# Patient Record
Sex: FEMALE | Race: WHITE | Employment: FULL TIME | URBAN - METROPOLITAN AREA
[De-identification: names, ages, dates, MRNs, and addresses within clinical notes are randomized per-mention and may not be internally consistent; named-entity substitution may affect disease eponyms.]

---

## 2021-07-30 ENCOUNTER — HOSPITAL ENCOUNTER (EMERGENCY)
Age: 41
Discharge: HOME | End: 2021-07-30
Attending: EMERGENCY MEDICINE
Payer: COMMERCIAL

## 2021-07-30 ENCOUNTER — APPOINTMENT (OUTPATIENT)
Dept: GENERAL RADIOLOGY | Age: 41
End: 2021-07-30
Payer: COMMERCIAL

## 2021-07-30 ENCOUNTER — APPOINTMENT (OUTPATIENT)
Dept: CT IMAGING | Age: 41
End: 2021-07-30
Payer: COMMERCIAL

## 2021-07-30 VITALS
OXYGEN SATURATION: 95 % | SYSTOLIC BLOOD PRESSURE: 139 MMHG | RESPIRATION RATE: 18 BRPM | TEMPERATURE: 99.3 F | WEIGHT: 160 LBS | BODY MASS INDEX: 25.11 KG/M2 | HEART RATE: 127 BPM | HEIGHT: 67 IN | DIASTOLIC BLOOD PRESSURE: 71 MMHG

## 2021-07-30 DIAGNOSIS — M79.10 MYALGIA: ICD-10-CM

## 2021-07-30 DIAGNOSIS — U07.1 COVID-19: Primary | ICD-10-CM

## 2021-07-30 LAB
ALBUMIN SERPL-MCNC: 3.8 G/DL (ref 3.5–5.2)
ALP BLD-CCNC: 85 U/L (ref 35–104)
ALT SERPL-CCNC: 37 U/L (ref 0–32)
ANION GAP SERPL CALCULATED.3IONS-SCNC: 16 MMOL/L (ref 7–16)
APTT: 27.4 SEC (ref 24.5–35.1)
AST SERPL-CCNC: 31 U/L (ref 0–31)
BACTERIA: ABNORMAL /HPF
BASOPHILS ABSOLUTE: 0.01 E9/L (ref 0–0.2)
BASOPHILS RELATIVE PERCENT: 0.2 % (ref 0–2)
BILIRUB SERPL-MCNC: 1.1 MG/DL (ref 0–1.2)
BILIRUBIN URINE: ABNORMAL
BLOOD, URINE: NEGATIVE
BUN BLDV-MCNC: 11 MG/DL (ref 6–20)
CALCIUM SERPL-MCNC: 8.8 MG/DL (ref 8.6–10.2)
CHLORIDE BLD-SCNC: 98 MMOL/L (ref 98–107)
CLARITY: CLEAR
CO2: 23 MMOL/L (ref 22–29)
COLOR: YELLOW
CREAT SERPL-MCNC: 1 MG/DL (ref 0.5–1)
D DIMER: 261 NG/ML DDU
EOSINOPHILS ABSOLUTE: 0 E9/L (ref 0.05–0.5)
EOSINOPHILS RELATIVE PERCENT: 0 % (ref 0–6)
EPITHELIAL CELLS, UA: ABNORMAL /HPF
FIBRINOGEN: >700 MG/DL (ref 225–540)
GFR AFRICAN AMERICAN: >60
GFR NON-AFRICAN AMERICAN: >60 ML/MIN/1.73
GLUCOSE BLD-MCNC: 103 MG/DL (ref 74–99)
GLUCOSE URINE: NEGATIVE MG/DL
HCG(URINE) PREGNANCY TEST: NEGATIVE
HCT VFR BLD CALC: 44.1 % (ref 34–48)
HEMOGLOBIN: 15.3 G/DL (ref 11.5–15.5)
IMMATURE GRANULOCYTES #: 0.04 E9/L
IMMATURE GRANULOCYTES %: 0.7 % (ref 0–5)
INR BLD: 1.3
KETONES, URINE: 15 MG/DL
LACTATE DEHYDROGENASE: 390 U/L (ref 135–214)
LACTIC ACID: 1.6 MMOL/L (ref 0.5–2.2)
LEUKOCYTE ESTERASE, URINE: NEGATIVE
LYMPHOCYTES ABSOLUTE: 1.04 E9/L (ref 1.5–4)
LYMPHOCYTES RELATIVE PERCENT: 18.9 % (ref 20–42)
MAGNESIUM: 2.1 MG/DL (ref 1.6–2.6)
MCH RBC QN AUTO: 30.2 PG (ref 26–35)
MCHC RBC AUTO-ENTMCNC: 34.7 % (ref 32–34.5)
MCV RBC AUTO: 87.2 FL (ref 80–99.9)
MONOCYTES ABSOLUTE: 0.52 E9/L (ref 0.1–0.95)
MONOCYTES RELATIVE PERCENT: 9.5 % (ref 2–12)
MUCUS: PRESENT /LPF
NEUTROPHILS ABSOLUTE: 3.88 E9/L (ref 1.8–7.3)
NEUTROPHILS RELATIVE PERCENT: 70.7 % (ref 43–80)
NITRITE, URINE: NEGATIVE
PDW BLD-RTO: 13 FL (ref 11.5–15)
PH UA: 6 (ref 5–9)
PLATELET # BLD: 243 E9/L (ref 130–450)
PMV BLD AUTO: 10.6 FL (ref 7–12)
POTASSIUM REFLEX MAGNESIUM: 3.4 MMOL/L (ref 3.5–5)
PROTEIN UA: 100 MG/DL
PROTHROMBIN TIME: 14.1 SEC (ref 9.3–12.4)
RBC # BLD: 5.06 E12/L (ref 3.5–5.5)
RBC UA: ABNORMAL /HPF (ref 0–2)
SODIUM BLD-SCNC: 137 MMOL/L (ref 132–146)
SPECIFIC GRAVITY UA: 1.01 (ref 1–1.03)
TOTAL PROTEIN: 7.6 G/DL (ref 6.4–8.3)
TROPONIN, HIGH SENSITIVITY: 6 NG/L (ref 0–9)
UROBILINOGEN, URINE: 0.2 E.U./DL
WBC # BLD: 5.5 E9/L (ref 4.5–11.5)
WBC UA: ABNORMAL /HPF (ref 0–5)

## 2021-07-30 PROCEDURE — 6360000004 HC RX CONTRAST MEDICATION: Performed by: RADIOLOGY

## 2021-07-30 PROCEDURE — 6360000002 HC RX W HCPCS: Performed by: PHYSICIAN ASSISTANT

## 2021-07-30 PROCEDURE — 83735 ASSAY OF MAGNESIUM: CPT

## 2021-07-30 PROCEDURE — 85730 THROMBOPLASTIN TIME PARTIAL: CPT

## 2021-07-30 PROCEDURE — 85610 PROTHROMBIN TIME: CPT

## 2021-07-30 PROCEDURE — 85384 FIBRINOGEN ACTIVITY: CPT

## 2021-07-30 PROCEDURE — 6370000000 HC RX 637 (ALT 250 FOR IP): Performed by: STUDENT IN AN ORGANIZED HEALTH CARE EDUCATION/TRAINING PROGRAM

## 2021-07-30 PROCEDURE — 6360000002 HC RX W HCPCS: Performed by: STUDENT IN AN ORGANIZED HEALTH CARE EDUCATION/TRAINING PROGRAM

## 2021-07-30 PROCEDURE — 71045 X-RAY EXAM CHEST 1 VIEW: CPT

## 2021-07-30 PROCEDURE — 71275 CT ANGIOGRAPHY CHEST: CPT

## 2021-07-30 PROCEDURE — 83605 ASSAY OF LACTIC ACID: CPT

## 2021-07-30 PROCEDURE — 99284 EMERGENCY DEPT VISIT MOD MDM: CPT

## 2021-07-30 PROCEDURE — 85378 FIBRIN DEGRADE SEMIQUANT: CPT

## 2021-07-30 PROCEDURE — 83615 LACTATE (LD) (LDH) ENZYME: CPT

## 2021-07-30 PROCEDURE — 80053 COMPREHEN METABOLIC PANEL: CPT

## 2021-07-30 PROCEDURE — 85025 COMPLETE CBC W/AUTO DIFF WBC: CPT

## 2021-07-30 PROCEDURE — 96375 TX/PRO/DX INJ NEW DRUG ADDON: CPT

## 2021-07-30 PROCEDURE — 87088 URINE BACTERIA CULTURE: CPT

## 2021-07-30 PROCEDURE — 81025 URINE PREGNANCY TEST: CPT

## 2021-07-30 PROCEDURE — 96374 THER/PROPH/DIAG INJ IV PUSH: CPT

## 2021-07-30 PROCEDURE — 84484 ASSAY OF TROPONIN QUANT: CPT

## 2021-07-30 PROCEDURE — 81001 URINALYSIS AUTO W/SCOPE: CPT

## 2021-07-30 PROCEDURE — 2580000003 HC RX 258: Performed by: PHYSICIAN ASSISTANT

## 2021-07-30 RX ORDER — POTASSIUM CHLORIDE 1.5 G/1.77G
40 POWDER, FOR SOLUTION ORAL ONCE
Status: DISCONTINUED | OUTPATIENT
Start: 2021-07-30 | End: 2021-07-30 | Stop reason: CLARIF

## 2021-07-30 RX ORDER — ONDANSETRON 2 MG/ML
4 INJECTION INTRAMUSCULAR; INTRAVENOUS ONCE
Status: COMPLETED | OUTPATIENT
Start: 2021-07-30 | End: 2021-07-30

## 2021-07-30 RX ORDER — KETOROLAC TROMETHAMINE 30 MG/ML
15 INJECTION, SOLUTION INTRAMUSCULAR; INTRAVENOUS ONCE
Status: COMPLETED | OUTPATIENT
Start: 2021-07-30 | End: 2021-07-30

## 2021-07-30 RX ORDER — ACETAMINOPHEN 650 MG/1
650 SUPPOSITORY RECTAL EVERY 6 HOURS PRN
Status: DISCONTINUED | OUTPATIENT
Start: 2021-07-30 | End: 2021-07-30 | Stop reason: HOSPADM

## 2021-07-30 RX ORDER — ACETAMINOPHEN 325 MG/1
650 TABLET ORAL EVERY 6 HOURS PRN
Status: DISCONTINUED | OUTPATIENT
Start: 2021-07-30 | End: 2021-07-30 | Stop reason: HOSPADM

## 2021-07-30 RX ORDER — DEXAMETHASONE SODIUM PHOSPHATE 4 MG/ML
6 INJECTION, SOLUTION INTRA-ARTICULAR; INTRALESIONAL; INTRAMUSCULAR; INTRAVENOUS; SOFT TISSUE ONCE
Status: COMPLETED | OUTPATIENT
Start: 2021-07-30 | End: 2021-07-30

## 2021-07-30 RX ORDER — 0.9 % SODIUM CHLORIDE 0.9 %
1000 INTRAVENOUS SOLUTION INTRAVENOUS ONCE
Status: COMPLETED | OUTPATIENT
Start: 2021-07-30 | End: 2021-07-30

## 2021-07-30 RX ADMIN — DEXAMETHASONE SODIUM PHOSPHATE 6 MG: 4 INJECTION, SOLUTION INTRAMUSCULAR; INTRAVENOUS at 19:16

## 2021-07-30 RX ADMIN — SODIUM CHLORIDE 1000 ML: 9 INJECTION, SOLUTION INTRAVENOUS at 19:15

## 2021-07-30 RX ADMIN — ONDANSETRON 4 MG: 2 INJECTION INTRAMUSCULAR; INTRAVENOUS at 19:16

## 2021-07-30 RX ADMIN — POTASSIUM BICARBONATE 40 MEQ: 782 TABLET, EFFERVESCENT ORAL at 21:40

## 2021-07-30 RX ADMIN — KETOROLAC TROMETHAMINE 15 MG: 30 INJECTION, SOLUTION INTRAMUSCULAR; INTRAVENOUS at 19:16

## 2021-07-30 RX ADMIN — IOPAMIDOL 75 ML: 755 INJECTION, SOLUTION INTRAVENOUS at 20:38

## 2021-07-30 ASSESSMENT — ENCOUNTER SYMPTOMS
SHORTNESS OF BREATH: 1
EYE DISCHARGE: 0
ABDOMINAL DISTENTION: 0
SINUS PRESSURE: 0
VOMITING: 1
COUGH: 1
DIARRHEA: 1
SORE THROAT: 0
NAUSEA: 1
EYE REDNESS: 0
ABDOMINAL PAIN: 0
EYE PAIN: 0
WHEEZING: 0
BACK PAIN: 0

## 2021-07-30 ASSESSMENT — PAIN SCALES - GENERAL: PAINLEVEL_OUTOF10: 7

## 2021-08-01 LAB — URINE CULTURE, ROUTINE: NORMAL

## 2022-05-09 ENCOUNTER — HOSPITAL ENCOUNTER (OUTPATIENT)
Age: 42
Discharge: HOME | End: 2022-05-11

## 2022-05-09 LAB
HCT VFR BLD CALC: 41.9 % (ref 34–48)
HEMOGLOBIN: 14.7 G/DL (ref 11.5–15.5)
MCH RBC QN AUTO: 31.2 PG (ref 26–35)
MCHC RBC AUTO-ENTMCNC: 35.1 % (ref 32–34.5)
MCV RBC AUTO: 89 FL (ref 80–99.9)
PDW BLD-RTO: 12.2 FL (ref 11.5–15)
PLATELET # BLD: 297 E9/L (ref 130–450)
PMV BLD AUTO: 10.7 FL (ref 7–12)
RBC # BLD: 4.71 E12/L (ref 3.5–5.5)
WBC # BLD: 6.6 E9/L (ref 4.5–11.5)

## 2022-05-09 PROCEDURE — 88304 TISSUE EXAM BY PATHOLOGIST: CPT

## 2022-05-09 PROCEDURE — 85027 COMPLETE CBC AUTOMATED: CPT

## 2022-06-24 ENCOUNTER — APPOINTMENT (OUTPATIENT)
Dept: GENERAL RADIOLOGY | Age: 42
DRG: 690 | End: 2022-06-24
Payer: COMMERCIAL

## 2022-06-24 ENCOUNTER — HOSPITAL ENCOUNTER (INPATIENT)
Age: 42
LOS: 3 days | Discharge: HOME | DRG: 690 | End: 2022-06-27
Attending: EMERGENCY MEDICINE | Admitting: FAMILY MEDICINE
Payer: COMMERCIAL

## 2022-06-24 ENCOUNTER — APPOINTMENT (OUTPATIENT)
Dept: CT IMAGING | Age: 42
DRG: 690 | End: 2022-06-24
Payer: COMMERCIAL

## 2022-06-24 DIAGNOSIS — R10.84 GENERALIZED ABDOMINAL PAIN: ICD-10-CM

## 2022-06-24 DIAGNOSIS — D72.829 LEUKOCYTOSIS, UNSPECIFIED TYPE: ICD-10-CM

## 2022-06-24 DIAGNOSIS — E87.6 HYPOKALEMIA: ICD-10-CM

## 2022-06-24 DIAGNOSIS — K59.00 CONSTIPATION, UNSPECIFIED CONSTIPATION TYPE: ICD-10-CM

## 2022-06-24 DIAGNOSIS — R11.0 NAUSEA: ICD-10-CM

## 2022-06-24 DIAGNOSIS — K52.9 ENTEROCOLITIS: Primary | ICD-10-CM

## 2022-06-24 PROBLEM — R10.9 ABDOMINAL PAIN: Status: ACTIVE | Noted: 2022-06-24

## 2022-06-24 PROBLEM — E80.6 HYPERBILIRUBINEMIA: Status: ACTIVE | Noted: 2022-06-24

## 2022-06-24 PROBLEM — Z90.49 HISTORY OF LAPAROSCOPIC CHOLECYSTECTOMY: Status: ACTIVE | Noted: 2022-06-24

## 2022-06-24 PROBLEM — N39.0 UTI (URINARY TRACT INFECTION): Status: ACTIVE | Noted: 2022-06-24

## 2022-06-24 LAB
ALBUMIN SERPL-MCNC: 4.1 G/DL (ref 3.5–5.2)
ALP BLD-CCNC: 107 U/L (ref 35–104)
ALT SERPL-CCNC: 17 U/L (ref 0–32)
AMORPHOUS: ABNORMAL
ANION GAP SERPL CALCULATED.3IONS-SCNC: 18 MMOL/L (ref 7–16)
AST SERPL-CCNC: 23 U/L (ref 0–31)
BACTERIA: ABNORMAL /HPF
BASOPHILS ABSOLUTE: 0.05 E9/L (ref 0–0.2)
BASOPHILS RELATIVE PERCENT: 0.2 % (ref 0–2)
BILIRUB SERPL-MCNC: 2.6 MG/DL (ref 0–1.2)
BILIRUBIN DIRECT: 0.3 MG/DL (ref 0–0.3)
BILIRUBIN DIRECT: 0.3 MG/DL (ref 0–0.3)
BILIRUBIN URINE: NEGATIVE
BILIRUBIN, INDIRECT: 2.3 MG/DL (ref 0–1)
BLOOD, URINE: ABNORMAL
BUN BLDV-MCNC: 15 MG/DL (ref 6–20)
CALCIUM SERPL-MCNC: 9 MG/DL (ref 8.6–10.2)
CHLORIDE BLD-SCNC: 94 MMOL/L (ref 98–107)
CLARITY: ABNORMAL
CO2: 21 MMOL/L (ref 22–29)
COLOR: ABNORMAL
CREAT SERPL-MCNC: 1.2 MG/DL (ref 0.5–1)
EOSINOPHILS ABSOLUTE: 0.11 E9/L (ref 0.05–0.5)
EOSINOPHILS RELATIVE PERCENT: 0.5 % (ref 0–6)
GFR AFRICAN AMERICAN: 60
GFR NON-AFRICAN AMERICAN: 49 ML/MIN/1.73
GLUCOSE BLD-MCNC: 136 MG/DL (ref 74–99)
GLUCOSE URINE: NEGATIVE MG/DL
HCG, URINE, POC: NEGATIVE
HCT VFR BLD CALC: 40.3 % (ref 34–48)
HEMOGLOBIN: 14 G/DL (ref 11.5–15.5)
HYALINE CASTS: ABNORMAL /LPF (ref 0–2)
IMMATURE GRANULOCYTES #: 0.19 E9/L
IMMATURE GRANULOCYTES %: 0.9 % (ref 0–5)
KETONES, URINE: NEGATIVE MG/DL
LACTIC ACID: 2.4 MMOL/L (ref 0.5–2.2)
LEUKOCYTE ESTERASE, URINE: ABNORMAL
LIPASE: 19 U/L (ref 13–60)
LYMPHOCYTES ABSOLUTE: 1.18 E9/L (ref 1.5–4)
LYMPHOCYTES RELATIVE PERCENT: 5.6 % (ref 20–42)
Lab: NORMAL
MAGNESIUM: 1.9 MG/DL (ref 1.6–2.6)
MCH RBC QN AUTO: 30.9 PG (ref 26–35)
MCHC RBC AUTO-ENTMCNC: 34.7 % (ref 32–34.5)
MCV RBC AUTO: 89 FL (ref 80–99.9)
MONOCYTES ABSOLUTE: 0.92 E9/L (ref 0.1–0.95)
MONOCYTES RELATIVE PERCENT: 4.4 % (ref 2–12)
MUCUS: PRESENT /LPF
NEGATIVE QC PASS/FAIL: NORMAL
NEUTROPHILS ABSOLUTE: 18.56 E9/L (ref 1.8–7.3)
NEUTROPHILS RELATIVE PERCENT: 88.4 % (ref 43–80)
NITRITE, URINE: NEGATIVE
PDW BLD-RTO: 13.1 FL (ref 11.5–15)
PH UA: 6 (ref 5–9)
PLATELET # BLD: 238 E9/L (ref 130–450)
PMV BLD AUTO: 10.8 FL (ref 7–12)
POSITIVE QC PASS/FAIL: NORMAL
POTASSIUM REFLEX MAGNESIUM: 3.1 MMOL/L (ref 3.5–5)
PROTEIN UA: NEGATIVE MG/DL
RBC # BLD: 4.53 E12/L (ref 3.5–5.5)
RBC UA: ABNORMAL /HPF (ref 0–2)
SODIUM BLD-SCNC: 133 MMOL/L (ref 132–146)
SPECIFIC GRAVITY UA: 1.02 (ref 1–1.03)
TOTAL PROTEIN: 7.2 G/DL (ref 6.4–8.3)
TSH SERPL DL<=0.05 MIU/L-ACNC: 5.4 UIU/ML (ref 0.27–4.2)
UROBILINOGEN, URINE: 0.2 E.U./DL
WBC # BLD: 21 E9/L (ref 4.5–11.5)
WBC UA: ABNORMAL /HPF (ref 0–5)

## 2022-06-24 PROCEDURE — 36415 COLL VENOUS BLD VENIPUNCTURE: CPT

## 2022-06-24 PROCEDURE — 2580000003 HC RX 258: Performed by: EMERGENCY MEDICINE

## 2022-06-24 PROCEDURE — 2500000003 HC RX 250 WO HCPCS: Performed by: STUDENT IN AN ORGANIZED HEALTH CARE EDUCATION/TRAINING PROGRAM

## 2022-06-24 PROCEDURE — 82248 BILIRUBIN DIRECT: CPT

## 2022-06-24 PROCEDURE — 96375 TX/PRO/DX INJ NEW DRUG ADDON: CPT

## 2022-06-24 PROCEDURE — 83735 ASSAY OF MAGNESIUM: CPT

## 2022-06-24 PROCEDURE — 2580000003 HC RX 258: Performed by: FAMILY MEDICINE

## 2022-06-24 PROCEDURE — 74177 CT ABD & PELVIS W/CONTRAST: CPT

## 2022-06-24 PROCEDURE — 83690 ASSAY OF LIPASE: CPT

## 2022-06-24 PROCEDURE — 81001 URINALYSIS AUTO W/SCOPE: CPT

## 2022-06-24 PROCEDURE — 6360000002 HC RX W HCPCS: Performed by: STUDENT IN AN ORGANIZED HEALTH CARE EDUCATION/TRAINING PROGRAM

## 2022-06-24 PROCEDURE — 83605 ASSAY OF LACTIC ACID: CPT

## 2022-06-24 PROCEDURE — 71045 X-RAY EXAM CHEST 1 VIEW: CPT

## 2022-06-24 PROCEDURE — 87040 BLOOD CULTURE FOR BACTERIA: CPT

## 2022-06-24 PROCEDURE — 2580000003 HC RX 258: Performed by: STUDENT IN AN ORGANIZED HEALTH CARE EDUCATION/TRAINING PROGRAM

## 2022-06-24 PROCEDURE — 96361 HYDRATE IV INFUSION ADD-ON: CPT

## 2022-06-24 PROCEDURE — 85025 COMPLETE CBC W/AUTO DIFF WBC: CPT

## 2022-06-24 PROCEDURE — 2500000003 HC RX 250 WO HCPCS: Performed by: FAMILY MEDICINE

## 2022-06-24 PROCEDURE — 96366 THER/PROPH/DIAG IV INF ADDON: CPT

## 2022-06-24 PROCEDURE — 99285 EMERGENCY DEPT VISIT HI MDM: CPT

## 2022-06-24 PROCEDURE — 84443 ASSAY THYROID STIM HORMONE: CPT

## 2022-06-24 PROCEDURE — 96365 THER/PROPH/DIAG IV INF INIT: CPT

## 2022-06-24 PROCEDURE — 6360000002 HC RX W HCPCS: Performed by: FAMILY MEDICINE

## 2022-06-24 PROCEDURE — 6360000004 HC RX CONTRAST MEDICATION: Performed by: RADIOLOGY

## 2022-06-24 PROCEDURE — 2060000000 HC ICU INTERMEDIATE R&B

## 2022-06-24 PROCEDURE — 80053 COMPREHEN METABOLIC PANEL: CPT

## 2022-06-24 PROCEDURE — 6370000000 HC RX 637 (ALT 250 FOR IP): Performed by: STUDENT IN AN ORGANIZED HEALTH CARE EDUCATION/TRAINING PROGRAM

## 2022-06-24 RX ORDER — SODIUM CHLORIDE 0.9 % (FLUSH) 0.9 %
5-40 SYRINGE (ML) INJECTION PRN
Status: DISCONTINUED | OUTPATIENT
Start: 2022-06-24 | End: 2022-06-27 | Stop reason: HOSPADM

## 2022-06-24 RX ORDER — POTASSIUM CHLORIDE 20 MEQ/1
40 TABLET, EXTENDED RELEASE ORAL ONCE
Status: DISCONTINUED | OUTPATIENT
Start: 2022-06-24 | End: 2022-06-24

## 2022-06-24 RX ORDER — ACETAMINOPHEN 650 MG/1
650 SUPPOSITORY RECTAL EVERY 6 HOURS PRN
Status: DISCONTINUED | OUTPATIENT
Start: 2022-06-24 | End: 2022-06-27 | Stop reason: HOSPADM

## 2022-06-24 RX ORDER — ONDANSETRON 2 MG/ML
4 INJECTION INTRAMUSCULAR; INTRAVENOUS EVERY 6 HOURS PRN
Status: DISCONTINUED | OUTPATIENT
Start: 2022-06-24 | End: 2022-06-27 | Stop reason: HOSPADM

## 2022-06-24 RX ORDER — SODIUM CHLORIDE 0.9 % (FLUSH) 0.9 %
5-40 SYRINGE (ML) INJECTION EVERY 12 HOURS SCHEDULED
Status: DISCONTINUED | OUTPATIENT
Start: 2022-06-24 | End: 2022-06-27 | Stop reason: HOSPADM

## 2022-06-24 RX ORDER — METRONIDAZOLE 500 MG/100ML
500 INJECTION, SOLUTION INTRAVENOUS ONCE
Status: COMPLETED | OUTPATIENT
Start: 2022-06-24 | End: 2022-06-24

## 2022-06-24 RX ORDER — ACETAMINOPHEN 325 MG/1
650 TABLET ORAL EVERY 6 HOURS PRN
Status: DISCONTINUED | OUTPATIENT
Start: 2022-06-24 | End: 2022-06-27 | Stop reason: ALTCHOICE

## 2022-06-24 RX ORDER — MORPHINE SULFATE 2 MG/ML
2 INJECTION, SOLUTION INTRAMUSCULAR; INTRAVENOUS EVERY 4 HOURS PRN
Status: DISCONTINUED | OUTPATIENT
Start: 2022-06-24 | End: 2022-06-27

## 2022-06-24 RX ORDER — MULTIVITAMIN/IRON/FOLIC ACID 18MG-0.4MG
1 TABLET ORAL EVERY MORNING
COMMUNITY

## 2022-06-24 RX ORDER — ONDANSETRON 2 MG/ML
4 INJECTION INTRAMUSCULAR; INTRAVENOUS EVERY 6 HOURS PRN
Status: DISCONTINUED | OUTPATIENT
Start: 2022-06-24 | End: 2022-06-27 | Stop reason: SDUPTHER

## 2022-06-24 RX ORDER — SODIUM CHLORIDE 9 MG/ML
INJECTION, SOLUTION INTRAVENOUS CONTINUOUS
Status: DISCONTINUED | OUTPATIENT
Start: 2022-06-24 | End: 2022-06-25

## 2022-06-24 RX ORDER — POTASSIUM CHLORIDE 7.45 MG/ML
10 INJECTION INTRAVENOUS ONCE
Status: COMPLETED | OUTPATIENT
Start: 2022-06-24 | End: 2022-06-24

## 2022-06-24 RX ORDER — LEVOTHYROXINE SODIUM 0.05 MG/1
50 TABLET ORAL EVERY MORNING
COMMUNITY

## 2022-06-24 RX ORDER — 0.9 % SODIUM CHLORIDE 0.9 %
1000 INTRAVENOUS SOLUTION INTRAVENOUS ONCE
Status: COMPLETED | OUTPATIENT
Start: 2022-06-24 | End: 2022-06-24

## 2022-06-24 RX ORDER — KETOROLAC TROMETHAMINE 30 MG/ML
15 INJECTION, SOLUTION INTRAMUSCULAR; INTRAVENOUS ONCE
Status: COMPLETED | OUTPATIENT
Start: 2022-06-24 | End: 2022-06-24

## 2022-06-24 RX ORDER — SODIUM CHLORIDE 9 MG/ML
INJECTION, SOLUTION INTRAVENOUS PRN
Status: DISCONTINUED | OUTPATIENT
Start: 2022-06-24 | End: 2022-06-27 | Stop reason: HOSPADM

## 2022-06-24 RX ORDER — ONDANSETRON 4 MG/1
4 TABLET, ORALLY DISINTEGRATING ORAL EVERY 8 HOURS PRN
Status: DISCONTINUED | OUTPATIENT
Start: 2022-06-24 | End: 2022-06-27 | Stop reason: HOSPADM

## 2022-06-24 RX ORDER — SODIUM CHLORIDE 9 MG/ML
500 INJECTION, SOLUTION INTRAVENOUS CONTINUOUS
Status: DISCONTINUED | OUTPATIENT
Start: 2022-06-24 | End: 2022-06-27

## 2022-06-24 RX ORDER — POLYETHYLENE GLYCOL 3350 17 G/17G
17 POWDER, FOR SOLUTION ORAL DAILY PRN
Status: DISCONTINUED | OUTPATIENT
Start: 2022-06-24 | End: 2022-06-27 | Stop reason: HOSPADM

## 2022-06-24 RX ORDER — ENOXAPARIN SODIUM 100 MG/ML
40 INJECTION SUBCUTANEOUS DAILY
Status: DISCONTINUED | OUTPATIENT
Start: 2022-06-24 | End: 2022-06-27 | Stop reason: HOSPADM

## 2022-06-24 RX ORDER — DICYCLOMINE HYDROCHLORIDE 10 MG/1
20 CAPSULE ORAL ONCE
Status: COMPLETED | OUTPATIENT
Start: 2022-06-24 | End: 2022-06-24

## 2022-06-24 RX ORDER — MORPHINE SULFATE 4 MG/ML
4 INJECTION, SOLUTION INTRAMUSCULAR; INTRAVENOUS ONCE
Status: COMPLETED | OUTPATIENT
Start: 2022-06-24 | End: 2022-06-24

## 2022-06-24 RX ORDER — ACETAMINOPHEN 325 MG/1
650 TABLET ORAL EVERY 6 HOURS PRN
Status: DISCONTINUED | OUTPATIENT
Start: 2022-06-24 | End: 2022-06-27 | Stop reason: HOSPADM

## 2022-06-24 RX ORDER — METRONIDAZOLE 500 MG/100ML
500 INJECTION, SOLUTION INTRAVENOUS EVERY 8 HOURS
Status: DISCONTINUED | OUTPATIENT
Start: 2022-06-24 | End: 2022-06-27 | Stop reason: HOSPADM

## 2022-06-24 RX ADMIN — ENOXAPARIN SODIUM 40 MG: 100 INJECTION SUBCUTANEOUS at 20:30

## 2022-06-24 RX ADMIN — DICYCLOMINE HYDROCHLORIDE 20 MG: 10 CAPSULE ORAL at 09:05

## 2022-06-24 RX ADMIN — MORPHINE SULFATE 4 MG: 4 INJECTION, SOLUTION INTRAMUSCULAR; INTRAVENOUS at 10:17

## 2022-06-24 RX ADMIN — WATER 2000 MG: 1 INJECTION INTRAMUSCULAR; INTRAVENOUS; SUBCUTANEOUS at 14:56

## 2022-06-24 RX ADMIN — KETOROLAC TROMETHAMINE 15 MG: 30 INJECTION, SOLUTION INTRAMUSCULAR; INTRAVENOUS at 09:05

## 2022-06-24 RX ADMIN — SODIUM CHLORIDE: 9 INJECTION, SOLUTION INTRAVENOUS at 17:19

## 2022-06-24 RX ADMIN — METRONIDAZOLE 500 MG: 500 INJECTION, SOLUTION INTRAVENOUS at 15:01

## 2022-06-24 RX ADMIN — IOPAMIDOL 50 ML: 755 INJECTION, SOLUTION INTRAVENOUS at 09:30

## 2022-06-24 RX ADMIN — SODIUM CHLORIDE 500 ML: 9 INJECTION, SOLUTION INTRAVENOUS at 13:30

## 2022-06-24 RX ADMIN — MORPHINE SULFATE 2 MG: 2 INJECTION, SOLUTION INTRAMUSCULAR; INTRAVENOUS at 23:06

## 2022-06-24 RX ADMIN — METRONIDAZOLE 500 MG: 500 INJECTION, SOLUTION INTRAVENOUS at 20:30

## 2022-06-24 RX ADMIN — SODIUM CHLORIDE, PRESERVATIVE FREE 10 ML: 5 INJECTION INTRAVENOUS at 20:30

## 2022-06-24 RX ADMIN — POTASSIUM CHLORIDE 10 MEQ: 7.46 INJECTION, SOLUTION INTRAVENOUS at 10:17

## 2022-06-24 RX ADMIN — MORPHINE SULFATE 2 MG: 2 INJECTION, SOLUTION INTRAMUSCULAR; INTRAVENOUS at 19:08

## 2022-06-24 RX ADMIN — SODIUM CHLORIDE 1000 ML: 9 INJECTION, SOLUTION INTRAVENOUS at 09:00

## 2022-06-24 ASSESSMENT — PAIN DESCRIPTION - PAIN TYPE
TYPE: ACUTE PAIN

## 2022-06-24 ASSESSMENT — ENCOUNTER SYMPTOMS
SHORTNESS OF BREATH: 0
ABDOMINAL PAIN: 1
BACK PAIN: 1
DIARRHEA: 0
COLOR CHANGE: 0
SORE THROAT: 0
COUGH: 0
EYE PAIN: 0
TROUBLE SWALLOWING: 0
ABDOMINAL DISTENTION: 1
CONSTIPATION: 1
NAUSEA: 1
DIARRHEA: 1
FACIAL SWELLING: 0
VOMITING: 0

## 2022-06-24 ASSESSMENT — PAIN DESCRIPTION - ONSET
ONSET: ON-GOING
ONSET: ON-GOING

## 2022-06-24 ASSESSMENT — PAIN DESCRIPTION - DESCRIPTORS
DESCRIPTORS: ACHING;CRAMPING;DISCOMFORT
DESCRIPTORS: CRAMPING
DESCRIPTORS: CRAMPING
DESCRIPTORS: ACHING;CRAMPING;DISCOMFORT

## 2022-06-24 ASSESSMENT — PAIN DESCRIPTION - LOCATION
LOCATION: ABDOMEN

## 2022-06-24 ASSESSMENT — PAIN SCALES - GENERAL
PAINLEVEL_OUTOF10: 10
PAINLEVEL_OUTOF10: 10
PAINLEVEL_OUTOF10: 2
PAINLEVEL_OUTOF10: 2
PAINLEVEL_OUTOF10: 8
PAINLEVEL_OUTOF10: 7
PAINLEVEL_OUTOF10: 10

## 2022-06-24 ASSESSMENT — PAIN - FUNCTIONAL ASSESSMENT
PAIN_FUNCTIONAL_ASSESSMENT: PREVENTS OR INTERFERES SOME ACTIVE ACTIVITIES AND ADLS
PAIN_FUNCTIONAL_ASSESSMENT: 0-10
PAIN_FUNCTIONAL_ASSESSMENT: PREVENTS OR INTERFERES SOME ACTIVE ACTIVITIES AND ADLS
PAIN_FUNCTIONAL_ASSESSMENT: PREVENTS OR INTERFERES SOME ACTIVE ACTIVITIES AND ADLS

## 2022-06-24 ASSESSMENT — PAIN DESCRIPTION - FREQUENCY
FREQUENCY: CONTINUOUS
FREQUENCY: CONTINUOUS

## 2022-06-24 NOTE — ED NOTES
[facility] called to let us know that they will not have dose for pt hidascan until tomorrow.      Maude Perkins RN  06/24/22 2042

## 2022-06-24 NOTE — H&P
Danyelle Quintanilla MD History and Physical      CHIEF COMPLAINT:  Abdominal pain    History Obtained From:  Patient    HISTORY OF PRESENT ILLNESS:    The patient is a 39 y.o. female with significant past medical history of laparoscopic cholecystectomy 5/9/22 by Dr. Griselda Coker who presents with abdominal pain since the morning of 6/23/22. She felt constipated and took Miralax around 10 am yesterday and had a small amount of liquid stool output but no solid BM and felt like she did not evacuate well. Last normal formed bowel movement was 6/21/22. She reports she feels bloated and diffuse abdominal pain, worst in right lower abdomen. She reports nausea, but no vomiting, she is not able to eat. She also reports some urinary frequency, but no dysuria. No fever, no URI, no cough. She states yesterday she had pain in abdomen and pain in lumbar back area also. She states that she had recovered well from the gallbladder surgery and was not having abdominal pain or GI issues until yesterday morning. Past Medical History:     has no past medical history on file. Past Surgical History:     has a past surgical history that includes Breast lumpectomy (Left, 2011). Medications Prior to Admission:    Not in a hospital admission. Allergies:  Sulfa antibiotics    Social History:    reports that she has never smoked. She has never used smokeless tobacco. She reports current alcohol use. She reports that she does not use drugs. Family History:   No family history on file.     REVIEW OF SYSTEMS:  CONSTITUTIONAL:  negative for  fevers, chills and weight loss  RESPIRATORY:  negative for  dry cough, dyspnea, wheezing and chest pain  CARDIOVASCULAR:  negative for  chest pain, dyspnea  GASTROINTESTINAL:  positive for constipation and abdominal pain  GENITOURINARY:  positive for frequency  MUSCULOSKELETAL:  negative for  myalgias and arthralgias  NEUROLOGICAL:  negative for headaches, dizziness, memory problems, dysphagia, weakness, numbness and syncope    PHYSICAL EXAM:  Vitals:  /86   Pulse 82   Temp 99 °F (37.2 °C) (Oral)   Resp 16   Ht 5' 4\" (1.626 m)   Wt 204 lb (92.5 kg)   SpO2 99%   BMI 35.02 kg/m²   CONSTITUTIONAL:  awake, alert, appears very uncomfortable, on the verge of tears due to nausea and abdominal pain, cooperative, and appears stated age  EYES:  Lids and lashes normal, pupils equal, round and reactive to light, extra ocular muscles intact, sclera clear, conjunctiva normal  ENT:  Normocephalic, without obvious abnormality, atraumatic, sinuses nontender on palpation, external ears without lesions, oral pharynx with moist mucus membranes, tonsils without erythema or exudates, gums normal and good dentition. NECK:  Supple, symmetrical, trachea midline, no adenopathy, thyroid symmetric, not enlarged and no tenderness, skin normal  HEMATOLOGIC/LYMPHATICS:  no cervical lymphadenopathy  BACK:  Symmetric, no curvature, spinous processes are non-tender on palpation, paraspinous muscles are non-tender on palpation, no costal vertebral tenderness  LUNGS:  No increased work of breathing, good air exchange, clear to auscultation bilaterally, no crackles or wheezing  CARDIOVASCULAR:  Normal apical impulse, regular rate and rhythm, normal S1 and S2, no S3 or S4, and no murmur noted  ABDOMEN:  Mildly distended with increased tympany and diffuse tenderness, worse in RLQ. No HSM, no masses, no CVA tenderness. Bowel sounds are hypoactive. CHEST/BREASTS:  No chest tenderness  GENITAL/URINARY:  deferred  MUSCULOSKELETAL:  No cyanosis, no edema, no clubbing  NEUROLOGIC:  Awake, alert, oriented to name, place and time. Cranial nerves II-XII are grossly intact. Motor is 5 out of 5 bilaterally.     SKIN:  no bruising or bleeding and no rashes    DATA:  EKG:    CBC with Differential:    Lab Results   Component Value Date    WBC 21.0 06/24/2022    RBC 4.53 06/24/2022    HGB 14.0 06/24/2022    HCT 40.3 06/24/2022     06/24/2022    MCV 89.0 06/24/2022    MCH 30.9 06/24/2022    MCHC 34.7 06/24/2022    RDW 13.1 06/24/2022    LYMPHOPCT 5.6 06/24/2022    MONOPCT 4.4 06/24/2022    BASOPCT 0.2 06/24/2022    MONOSABS 0.92 06/24/2022    LYMPHSABS 1.18 06/24/2022    EOSABS 0.11 06/24/2022    BASOSABS 0.05 06/24/2022     CMP:    Lab Results   Component Value Date     06/24/2022    K 3.1 06/24/2022    CL 94 06/24/2022    CO2 21 06/24/2022    BUN 15 06/24/2022    CREATININE 1.2 06/24/2022    GFRAA 60 06/24/2022    LABGLOM 49 06/24/2022    GLUCOSE 136 06/24/2022    PROT 7.2 06/24/2022    LABALBU 4.1 06/24/2022    CALCIUM 9.0 06/24/2022    BILITOT 2.6 06/24/2022    ALKPHOS 107 06/24/2022    AST 23 06/24/2022    ALT 17 06/24/2022     PT/INR:    Lab Results   Component Value Date    PROTIME 14.1 07/30/2021    INR 1.3 07/30/2021     Last 3 Troponin:  No results found for: TROPONINI, CKTOTAL, CKMB, CKMBINDEX  TSH:    Lab Results   Component Value Date    TSH 5.400 06/24/2022     Radiology Review:  CT of abdomen with some dilation of bowel loops, no biliary obstruction noted. ASSESSMENT AND PLAN:    Patient Active Problem List    Diagnosis Date Noted    Abdominal pain 06/24/2022    Leukocytosis 06/24/2022    UTI (urinary tract infection) 06/24/2022    Hyperbilirubinemia 06/24/2022    Enterocolitis 06/24/2022    History of laparoscopic cholecystectomy 06/24/2022         Admit the patient. Continue with Rocephin and Flagyl to treat UTI and enterocolitis possibilites. Appreciate input from surgery. Evaluate for cause of hyperbilirubinemia.

## 2022-06-24 NOTE — PROGRESS NOTES
Database initiated. Patient is A&O independent from home alone. No assistive devices and is RA at baseline.

## 2022-06-24 NOTE — ED PROVIDER NOTES
Chief Complaint   Patient presents with    Abdominal Pain     diffuse cramping started yesterday    Nausea    Constipation     no solid BM for 3-4 days       HPI   Kaiden Melvin is a 39 y.o. old female presenting to the emergency department with complaint of abdominal pain, nausea, and constipation. Patient has been having diffuse abdominal cramping that started yesterday. She complains of nausea without emesis. Patient states she has not had a solid bowel movement for the past 3 to 4 days. She took MiraLAX and a laxative yesterday and is now been having some liquid diarrhea. Patient reports a recent laparoscopic cholecystectomy 5/9/22. She stated she was fine after the procedure up until now. Patient reports a migraine headache located on both sides of her temples and by her eyes for the past 2 days. She has been taking Tylenol with minimal improvement of her symptoms. Review of Systems   Constitutional: Positive for appetite change and chills. Negative for fever. HENT: Negative for ear pain and sore throat. Eyes: Negative for pain. Respiratory: Negative for shortness of breath. Cardiovascular: Negative for chest pain. Gastrointestinal: Positive for abdominal distention, abdominal pain, constipation, diarrhea and nausea. Negative for vomiting. Genitourinary: Negative for dysuria, flank pain and pelvic pain. Musculoskeletal: Positive for back pain. Negative for neck pain. Skin: Negative for rash. Neurological: Positive for headaches. Psychiatric/Behavioral: Negative for behavioral problems. The patient is not nervous/anxious. Physical Exam  Constitutional:       General: She is in acute distress. Appearance: Normal appearance. She is obese. She is ill-appearing. HENT:      Head: Normocephalic and atraumatic.       Right Ear: External ear normal.      Left Ear: External ear normal.      Nose: Nose normal.      Mouth/Throat:      Mouth: Mucous membranes are moist.      Pharynx: No oropharyngeal exudate. Eyes:      Extraocular Movements: Extraocular movements intact. Conjunctiva/sclera: Conjunctivae normal.      Pupils: Pupils are equal, round, and reactive to light. Cardiovascular:      Rate and Rhythm: Regular rhythm. Tachycardia present. Pulses: Normal pulses. Pulmonary:      Effort: No respiratory distress. Breath sounds: Normal breath sounds. Abdominal:      General: Bowel sounds are normal.      Palpations: Abdomen is soft. Tenderness: There is generalized abdominal tenderness. There is rebound. There is no guarding. Hernia: There is no hernia in the umbilical area or ventral area. Musculoskeletal:         General: No deformity or signs of injury. Cervical back: Neck supple. No rigidity. Skin:     General: Skin is warm and dry. Capillary Refill: Capillary refill takes less than 2 seconds. Neurological:      General: No focal deficit present. Mental Status: She is alert and oriented to person, place, and time. Mental status is at baseline. Psychiatric:         Mood and Affect: Mood normal.         Behavior: Behavior normal.          Procedures     MDM   59-year-old female presenting with abdominal pain after recent laparoscopic cholecystectomy. On physical exam patient was seen in mild acute distress with rebound tenderness. Lab work and imaging was significant for a mild lactic acidosis, hyperbilirubinemia, leukocytosis, and hypokalemia. Imaging showed a possible developing small bowel obstruction versus postoperative ileus versus enterocolitis. General surgery was consulted. They recommended admission to medicine. Patient was started on Flagyl and Rocephin and given fluids and pain control medication. Patient was made NPO. ED Course as of 06/25/22 1433   Fri Jun 24, 2022   1244 General surgery was consulted. Patient was evaluated by the general surgery resident.   They recommended admission to medicine. [TO]   1800 I spoke with Dr. Teo Almanzar who agreed to accept the patient for admission. He recommended starting the patient on Rocephin and Flagyl. [TO]      ED Course User Index  [TO] Joelle Reed, DO       --------------------------------------------- PAST HISTORY ---------------------------------------------  Past Medical History:  has no past medical history on file. Past Surgical History:  has a past surgical history that includes Breast lumpectomy (Left, 2011). Social History:  reports that she has never smoked. She has never used smokeless tobacco. She reports current alcohol use. She reports that she does not use drugs. Family History: family history is not on file. The patients home medications have been reviewed.     Allergies: Adhesive tape, Codeine, and Sulfa antibiotics    -------------------------------------------------- RESULTS -------------------------------------------------    LABS:  Results for orders placed or performed during the hospital encounter of 06/24/22   CBC with Auto Differential   Result Value Ref Range    WBC 21.0 (H) 4.5 - 11.5 E9/L    RBC 4.53 3.50 - 5.50 E12/L    Hemoglobin 14.0 11.5 - 15.5 g/dL    Hematocrit 40.3 34.0 - 48.0 %    MCV 89.0 80.0 - 99.9 fL    MCH 30.9 26.0 - 35.0 pg    MCHC 34.7 (H) 32.0 - 34.5 %    RDW 13.1 11.5 - 15.0 fL    Platelets 104 440 - 056 E9/L    MPV 10.8 7.0 - 12.0 fL    Neutrophils % 88.4 (H) 43.0 - 80.0 %    Immature Granulocytes % 0.9 0.0 - 5.0 %    Lymphocytes % 5.6 (L) 20.0 - 42.0 %    Monocytes % 4.4 2.0 - 12.0 %    Eosinophils % 0.5 0.0 - 6.0 %    Basophils % 0.2 0.0 - 2.0 %    Neutrophils Absolute 18.56 (H) 1.80 - 7.30 E9/L    Immature Granulocytes # 0.19 E9/L    Lymphocytes Absolute 1.18 (L) 1.50 - 4.00 E9/L    Monocytes Absolute 0.92 0.10 - 0.95 E9/L    Eosinophils Absolute 0.11 0.05 - 0.50 E9/L    Basophils Absolute 0.05 0.00 - 0.20 E9/L   Comprehensive Metabolic Panel w/ Reflex to MG   Result Value Ref Range    Sodium 133 132 - 146 mmol/L    Potassium reflex Magnesium 3.1 (L) 3.5 - 5.0 mmol/L    Chloride 94 (L) 98 - 107 mmol/L    CO2 21 (L) 22 - 29 mmol/L    Anion Gap 18 (H) 7 - 16 mmol/L    Glucose 136 (H) 74 - 99 mg/dL    BUN 15 6 - 20 mg/dL    CREATININE 1.2 (H) 0.5 - 1.0 mg/dL    GFR Non-African American 49 >=60 mL/min/1.73    GFR African American 60     Calcium 9.0 8.6 - 10.2 mg/dL    Total Protein 7.2 6.4 - 8.3 g/dL    Albumin 4.1 3.5 - 5.2 g/dL    Total Bilirubin 2.6 (H) 0.0 - 1.2 mg/dL    Alkaline Phosphatase 107 (H) 35 - 104 U/L    ALT 17 0 - 32 U/L    AST 23 0 - 31 U/L   Lipase   Result Value Ref Range    Lipase 19 13 - 60 U/L   Urinalysis with Microscopic   Result Value Ref Range    Color, UA DARK YELLOW (A) Straw/Yellow    Clarity, UA SL CLOUDY Clear    Glucose, Ur Negative Negative mg/dL    Bilirubin Urine Negative Negative    Ketones, Urine Negative Negative mg/dL    Specific Gravity, UA 1.020 1.005 - 1.030    Blood, Urine TRACE-LYSED Negative    pH, UA 6.0 5.0 - 9.0    Protein, UA Negative Negative mg/dL    Urobilinogen, Urine 0.2 <2.0 E.U./dL    Nitrite, Urine Negative Negative    Leukocyte Esterase, Urine MODERATE (A) Negative    Hyaline Casts, UA 3-5 (A) 0 - 2 /LPF    Mucus, UA Present (A) None Seen /LPF    WBC, UA 2-5 0 - 5 /HPF    RBC, UA 0-1 0 - 2 /HPF    Bacteria, UA MODERATE (A) None Seen /HPF    Amorphous, UA FEW    TSH   Result Value Ref Range    TSH 5.400 (H) 0.270 - 4.200 uIU/mL   Lactic Acid   Result Value Ref Range    Lactic Acid 2.4 (H) 0.5 - 2.2 mmol/L   Magnesium   Result Value Ref Range    Magnesium 1.9 1.6 - 2.6 mg/dL   Bilirubin, Direct   Result Value Ref Range    Bilirubin, Direct 0.3 0.0 - 0.3 mg/dL   Bilirubin, Direct   Result Value Ref Range    Bilirubin, Direct 0.3 0.0 - 0.3 mg/dL   Indirect Bilirubin   Result Value Ref Range    Bilirubin, Indirect 2.3 (H) 0.0 - 1.0 mg/dL   CBC with Auto Differential   Result Value Ref Range    WBC 13.7 (H) 4.5 - 11.5 E9/L    RBC 4.05 3.50 - 5.50 E12/L Hemoglobin 12.4 11.5 - 15.5 g/dL    Hematocrit 36.3 34.0 - 48.0 %    MCV 89.6 80.0 - 99.9 fL    MCH 30.6 26.0 - 35.0 pg    MCHC 34.2 32.0 - 34.5 %    RDW 13.2 11.5 - 15.0 fL    Platelets 110 886 - 113 E9/L    MPV 10.4 7.0 - 12.0 fL    Neutrophils % 86.8 (H) 43.0 - 80.0 %    Immature Granulocytes % 0.8 0.0 - 5.0 %    Lymphocytes % 7.4 (L) 20.0 - 42.0 %    Monocytes % 4.7 2.0 - 12.0 %    Eosinophils % 0.1 0.0 - 6.0 %    Basophils % 0.2 0.0 - 2.0 %    Neutrophils Absolute 11.91 (H) 1.80 - 7.30 E9/L    Immature Granulocytes # 0.11 E9/L    Lymphocytes Absolute 1.02 (L) 1.50 - 4.00 E9/L    Monocytes Absolute 0.64 0.10 - 0.95 E9/L    Eosinophils Absolute 0.02 (L) 0.05 - 0.50 E9/L    Basophils Absolute 0.03 0.00 - 0.20 J1/N   Basic Metabolic Panel   Result Value Ref Range    Sodium 137 132 - 146 mmol/L    Potassium 3.3 (L) 3.5 - 5.0 mmol/L    Chloride 104 98 - 107 mmol/L    CO2 20 (L) 22 - 29 mmol/L    Anion Gap 13 7 - 16 mmol/L    Glucose 112 (H) 74 - 99 mg/dL    BUN 11 6 - 20 mg/dL    CREATININE 0.8 0.5 - 1.0 mg/dL    GFR Non-African American >60 >=60 mL/min/1.73    GFR African American >60     Calcium 8.3 (L) 8.6 - 10.2 mg/dL   Hepatic Function Panel   Result Value Ref Range    Total Protein 6.1 (L) 6.4 - 8.3 g/dL    Albumin 3.2 (L) 3.5 - 5.2 g/dL    Alkaline Phosphatase 80 35 - 104 U/L    ALT 12 0 - 32 U/L    AST 12 0 - 31 U/L    Total Bilirubin 1.4 (H) 0.0 - 1.2 mg/dL    Bilirubin, Direct 0.3 0.0 - 0.3 mg/dL    Bilirubin, Indirect 1.1 (H) 0.0 - 1.0 mg/dL   Lactic Acid   Result Value Ref Range    Lactic Acid 0.8 0.5 - 2.2 mmol/L   POC Pregnancy Urine   Result Value Ref Range    HCG, Urine, POC Negative Negative    Lot Number [id number]     Positive QC Pass/Fail Pass     Negative QC Pass/Fail Pass        RADIOLOGY:  NM HEPATOBILIARY   Final Result   No convincing scintigraphic evidence of a bile leak. No signs of obstruction. XR CHEST PORTABLE   Final Result   NG tube in satisfactory position.          CT ABDOMEN bedside re-evaluations and IV medications    This patient has remained hemodynamically stable during their ED course. Diagnosis:  1. Enterocolitis    2. Generalized abdominal pain    3. Constipation, unspecified constipation type    4. Nausea    5. Leukocytosis, unspecified type    6. Hypokalemia        Disposition:  Patient's disposition: Admit to med/surg floor  Patient's condition is stable.            Panfilo Shrestha DO  Resident  06/25/22 5118

## 2022-06-24 NOTE — CONSULTS
GENERAL SURGERY  CONSULT NOTE  6/24/2022    Physician Consulted: Dr. Jo Louis  Reason for Consult: abdominal pain  Referring Physician: Dr. Paz MORILLO  Sanjeev Olivares is a 39 y.o. female who presents to the general surgery service for evaluation of abdominal pain. The patient is s/p laparoscopic cholecystectomy on 5/9. She has been doing well postoperatively, with no complaints. Yesterday, the patient began to experience nausea, abdominal distention, and abdominal pain. She believes she was constipated, and took MiraLAX. She did pass a loose bowel movement this morning. She denies fevers, chills, or vomiting. She denies yellowing color to her skin, darkening urine, or light-colored stools. Medical history is significant for hypothyroidism. Abdominal surgical history is significant only for cholecystectomy. No prior endoscopy. The patient is not taking any blood thinning medications. She drinks alcohol socially and does not smoke. Past Surgical History:   Procedure Laterality Date    BREAST LUMPECTOMY Left 2011       Medications Prior to Admission:    Prior to Admission medications    Not on File       Allergies   Allergen Reactions    Sulfa Antibiotics        No family history on file. Social History     Tobacco Use    Smoking status: Never Smoker    Smokeless tobacco: Never Used   Vaping Use    Vaping Use: Never used   Substance Use Topics    Alcohol use: Yes     Comment: socially    Drug use: Never         Review of Systems   Constitutional: Negative for appetite change, chills, fatigue and fever. HENT: Negative for facial swelling and trouble swallowing. Respiratory: Negative for cough and shortness of breath. Cardiovascular: Negative for chest pain and palpitations. Gastrointestinal: Positive for abdominal distention, abdominal pain, constipation and nausea. Negative for diarrhea and vomiting. Endocrine: Negative for polydipsia and polyphagia.    Genitourinary: Negative for difficulty urinating and dysuria. Skin: Negative for color change and rash. Neurological: Negative for dizziness and weakness. Psychiatric/Behavioral: Negative for agitation, behavioral problems and confusion. PHYSICAL EXAM:    Vitals:    06/24/22 1020   BP: 105/80   Pulse: 86   Resp: 16   Temp:    SpO2: 98%       General Appearance:  awake, alert, oriented  Skin:  Skin color, texture, turgor normal. No rashes or lesions. Head/face:  NCAT  Eyes:  No gross abnormalities. Sclera nonicteric  Lungs/Chest:  Normal expansion. No respiratory distress. On room air. No chest wall tenderness  Heart: Warm throughout. Regular rate   Abdomen:  Soft, obese, moderate diffuse tenderness, no involuntary guarding. Well healed surgical incisions present. No palpable organomegaly or masses. Extremities: Extremities warm to touch, pink      LABS:    CBC  Recent Labs     06/24/22  0818   WBC 21.0*   HGB 14.0   HCT 40.3        BMP  Recent Labs     06/24/22  0818      K 3.1*   CL 94*   CO2 21*   BUN 15   CREATININE 1.2*   CALCIUM 9.0     Liver Function  Recent Labs     06/24/22  0818   LIPASE 19   BILITOT 2.6*   AST 23   ALT 17   ALKPHOS 107*   PROT 7.2   LABALBU 4.1     No results for input(s): LACTATE in the last 72 hours. No results for input(s): INR, PTT in the last 72 hours. Invalid input(s): PT    RADIOLOGY    I have personally reviewed all relevant labs and imaging. Urinalysis positive for bacteria and WBCs  Leukocytosis of 21  Bilirubin 2.6  LFTs within normal limits  CT abdomen/pelvis shows dilated stomach and small bowel, measuring up to 3.5 cm. No free fluid or free air. No significant findings within the gallbladder fossa      ASSESSMENT:  39 y.o. female with abdominal pain. UTI, hyperbilirubinemia, and possible gastroenteritis.  S/p laparoscopic cholecystectomy on 5/9    PLAN:  Arcenio Willard 8075 admission   IVF  pain/nausea control PRN   NG if worsening nausea   Blood cultures   Stool studies   Fractionate bilirubin   Daily CBC, BMP, hepatic function panel   HIDA scan   Recommend antibiotics for treatment of UTI    Discussed with Dr. Trinidad Linda    Electronically signed by Vanda Weiner DO on 6/24/22 at 12:47 PM EDT

## 2022-06-25 ENCOUNTER — APPOINTMENT (OUTPATIENT)
Dept: NUCLEAR MEDICINE | Age: 42
DRG: 690 | End: 2022-06-25
Payer: COMMERCIAL

## 2022-06-25 LAB
ALBUMIN SERPL-MCNC: 3.2 G/DL (ref 3.5–5.2)
ALP BLD-CCNC: 80 U/L (ref 35–104)
ALT SERPL-CCNC: 12 U/L (ref 0–32)
ANION GAP SERPL CALCULATED.3IONS-SCNC: 13 MMOL/L (ref 7–16)
AST SERPL-CCNC: 12 U/L (ref 0–31)
BASOPHILS ABSOLUTE: 0.03 E9/L (ref 0–0.2)
BASOPHILS RELATIVE PERCENT: 0.2 % (ref 0–2)
BILIRUB SERPL-MCNC: 1.4 MG/DL (ref 0–1.2)
BILIRUBIN DIRECT: 0.3 MG/DL (ref 0–0.3)
BILIRUBIN, INDIRECT: 1.1 MG/DL (ref 0–1)
BUN BLDV-MCNC: 11 MG/DL (ref 6–20)
CALCIUM SERPL-MCNC: 8.3 MG/DL (ref 8.6–10.2)
CHLORIDE BLD-SCNC: 104 MMOL/L (ref 98–107)
CO2: 20 MMOL/L (ref 22–29)
CREAT SERPL-MCNC: 0.8 MG/DL (ref 0.5–1)
EOSINOPHILS ABSOLUTE: 0.02 E9/L (ref 0.05–0.5)
EOSINOPHILS RELATIVE PERCENT: 0.1 % (ref 0–6)
GFR AFRICAN AMERICAN: >60
GFR NON-AFRICAN AMERICAN: >60 ML/MIN/1.73
GLUCOSE BLD-MCNC: 112 MG/DL (ref 74–99)
HCT VFR BLD CALC: 36.3 % (ref 34–48)
HEMOGLOBIN: 12.4 G/DL (ref 11.5–15.5)
IMMATURE GRANULOCYTES #: 0.11 E9/L
IMMATURE GRANULOCYTES %: 0.8 % (ref 0–5)
LACTIC ACID: 0.8 MMOL/L (ref 0.5–2.2)
LYMPHOCYTES ABSOLUTE: 1.02 E9/L (ref 1.5–4)
LYMPHOCYTES RELATIVE PERCENT: 7.4 % (ref 20–42)
MCH RBC QN AUTO: 30.6 PG (ref 26–35)
MCHC RBC AUTO-ENTMCNC: 34.2 % (ref 32–34.5)
MCV RBC AUTO: 89.6 FL (ref 80–99.9)
MONOCYTES ABSOLUTE: 0.64 E9/L (ref 0.1–0.95)
MONOCYTES RELATIVE PERCENT: 4.7 % (ref 2–12)
NEUTROPHILS ABSOLUTE: 11.91 E9/L (ref 1.8–7.3)
NEUTROPHILS RELATIVE PERCENT: 86.8 % (ref 43–80)
PDW BLD-RTO: 13.2 FL (ref 11.5–15)
PLATELET # BLD: 210 E9/L (ref 130–450)
PMV BLD AUTO: 10.4 FL (ref 7–12)
POTASSIUM SERPL-SCNC: 3.3 MMOL/L (ref 3.5–5)
POTASSIUM SERPL-SCNC: 3.6 MMOL/L (ref 3.5–5)
RBC # BLD: 4.05 E12/L (ref 3.5–5.5)
SODIUM BLD-SCNC: 137 MMOL/L (ref 132–146)
TOTAL PROTEIN: 6.1 G/DL (ref 6.4–8.3)
WBC # BLD: 13.7 E9/L (ref 4.5–11.5)

## 2022-06-25 PROCEDURE — 87324 CLOSTRIDIUM AG IA: CPT

## 2022-06-25 PROCEDURE — 2500000003 HC RX 250 WO HCPCS: Performed by: FAMILY MEDICINE

## 2022-06-25 PROCEDURE — 87449 NOS EACH ORGANISM AG IA: CPT

## 2022-06-25 PROCEDURE — 83605 ASSAY OF LACTIC ACID: CPT

## 2022-06-25 PROCEDURE — 80076 HEPATIC FUNCTION PANEL: CPT

## 2022-06-25 PROCEDURE — 3430000000 HC RX DIAGNOSTIC RADIOPHARMACEUTICAL: Performed by: RADIOLOGY

## 2022-06-25 PROCEDURE — 6370000000 HC RX 637 (ALT 250 FOR IP): Performed by: FAMILY MEDICINE

## 2022-06-25 PROCEDURE — 89055 LEUKOCYTE ASSESSMENT FECAL: CPT

## 2022-06-25 PROCEDURE — 85025 COMPLETE CBC W/AUTO DIFF WBC: CPT

## 2022-06-25 PROCEDURE — A9537 TC99M MEBROFENIN: HCPCS | Performed by: RADIOLOGY

## 2022-06-25 PROCEDURE — 2580000003 HC RX 258: Performed by: FAMILY MEDICINE

## 2022-06-25 PROCEDURE — 36415 COLL VENOUS BLD VENIPUNCTURE: CPT

## 2022-06-25 PROCEDURE — 80048 BASIC METABOLIC PNL TOTAL CA: CPT

## 2022-06-25 PROCEDURE — 78226 HEPATOBILIARY SYSTEM IMAGING: CPT

## 2022-06-25 PROCEDURE — 6360000002 HC RX W HCPCS: Performed by: SURGERY

## 2022-06-25 PROCEDURE — 84132 ASSAY OF SERUM POTASSIUM: CPT

## 2022-06-25 PROCEDURE — 6360000002 HC RX W HCPCS: Performed by: FAMILY MEDICINE

## 2022-06-25 PROCEDURE — 2060000000 HC ICU INTERMEDIATE R&B

## 2022-06-25 RX ORDER — DEXTROSE, SODIUM CHLORIDE, AND POTASSIUM CHLORIDE 5; .45; .15 G/100ML; G/100ML; G/100ML
INJECTION INTRAVENOUS CONTINUOUS
Status: DISCONTINUED | OUTPATIENT
Start: 2022-06-25 | End: 2022-06-27

## 2022-06-25 RX ORDER — POTASSIUM CHLORIDE 7.45 MG/ML
10 INJECTION INTRAVENOUS
Status: COMPLETED | OUTPATIENT
Start: 2022-06-25 | End: 2022-06-25

## 2022-06-25 RX ADMIN — METRONIDAZOLE 500 MG: 500 INJECTION, SOLUTION INTRAVENOUS at 22:02

## 2022-06-25 RX ADMIN — SODIUM CHLORIDE, PRESERVATIVE FREE 10 ML: 5 INJECTION INTRAVENOUS at 07:37

## 2022-06-25 RX ADMIN — MORPHINE SULFATE 2 MG: 2 INJECTION, SOLUTION INTRAMUSCULAR; INTRAVENOUS at 21:54

## 2022-06-25 RX ADMIN — METRONIDAZOLE 500 MG: 500 INJECTION, SOLUTION INTRAVENOUS at 14:31

## 2022-06-25 RX ADMIN — Medication 6 MILLICURIE: at 07:54

## 2022-06-25 RX ADMIN — POTASSIUM CHLORIDE 10 MEQ: 10 INJECTION, SOLUTION INTRAVENOUS at 14:31

## 2022-06-25 RX ADMIN — WATER 1000 MG: 1 INJECTION INTRAMUSCULAR; INTRAVENOUS; SUBCUTANEOUS at 14:25

## 2022-06-25 RX ADMIN — MORPHINE SULFATE 2 MG: 2 INJECTION, SOLUTION INTRAMUSCULAR; INTRAVENOUS at 17:44

## 2022-06-25 RX ADMIN — BENZOCAINE, BUTAMBEN, AND TETRACAINE HYDROCHLORIDE 1 SPRAY: .028; .004; .004 AEROSOL, SPRAY TOPICAL at 09:17

## 2022-06-25 RX ADMIN — SODIUM CHLORIDE, PRESERVATIVE FREE 10 ML: 5 INJECTION INTRAVENOUS at 21:00

## 2022-06-25 RX ADMIN — POTASSIUM CHLORIDE, DEXTROSE MONOHYDRATE AND SODIUM CHLORIDE: 150; 5; 450 INJECTION, SOLUTION INTRAVENOUS at 09:15

## 2022-06-25 RX ADMIN — MORPHINE SULFATE 2 MG: 2 INJECTION, SOLUTION INTRAMUSCULAR; INTRAVENOUS at 07:36

## 2022-06-25 RX ADMIN — METRONIDAZOLE 500 MG: 500 INJECTION, SOLUTION INTRAVENOUS at 05:30

## 2022-06-25 RX ADMIN — POTASSIUM CHLORIDE 10 MEQ: 10 INJECTION, SOLUTION INTRAVENOUS at 13:21

## 2022-06-25 RX ADMIN — MORPHINE SULFATE 2 MG: 2 INJECTION, SOLUTION INTRAMUSCULAR; INTRAVENOUS at 13:29

## 2022-06-25 RX ADMIN — POTASSIUM CHLORIDE, DEXTROSE MONOHYDRATE AND SODIUM CHLORIDE: 150; 5; 450 INJECTION, SOLUTION INTRAVENOUS at 22:00

## 2022-06-25 RX ADMIN — SODIUM CHLORIDE: 9 INJECTION, SOLUTION INTRAVENOUS at 02:29

## 2022-06-25 ASSESSMENT — PAIN DESCRIPTION - LOCATION
LOCATION: ABDOMEN

## 2022-06-25 ASSESSMENT — PAIN SCALES - GENERAL
PAINLEVEL_OUTOF10: 10
PAINLEVEL_OUTOF10: 8
PAINLEVEL_OUTOF10: 10
PAINLEVEL_OUTOF10: 10

## 2022-06-25 ASSESSMENT — PAIN DESCRIPTION - ONSET
ONSET: ON-GOING
ONSET: ON-GOING

## 2022-06-25 ASSESSMENT — PAIN DESCRIPTION - PAIN TYPE
TYPE: ACUTE PAIN

## 2022-06-25 ASSESSMENT — PAIN DESCRIPTION - DESCRIPTORS
DESCRIPTORS: ACHING
DESCRIPTORS: CRAMPING;DISCOMFORT
DESCRIPTORS: SHARP

## 2022-06-25 ASSESSMENT — PAIN DESCRIPTION - FREQUENCY
FREQUENCY: INTERMITTENT

## 2022-06-25 ASSESSMENT — PAIN - FUNCTIONAL ASSESSMENT
PAIN_FUNCTIONAL_ASSESSMENT: ACTIVITIES ARE NOT PREVENTED
PAIN_FUNCTIONAL_ASSESSMENT: PREVENTS OR INTERFERES SOME ACTIVE ACTIVITIES AND ADLS

## 2022-06-25 NOTE — PROGRESS NOTES
Department of Surgery   General Surgery  Dr. Shannon Weiner Progress Note      SUBJECTIVE:    Patient reports 5 diarrheal BM since admission. She does not report any funny smells or blood. Pain is less, but still present periumbilical.  + flatus, and NG output is 0 for last 5 hours and only 200 over last 12. OBJECTIVE      Physical    VITALS:  BP (!) 137/96   Pulse 93   Temp 98.6 °F (37 °C) (Oral)   Resp 16   Ht 5' 4\" (1.626 m)   Wt 207 lb 1.6 oz (93.9 kg)   SpO2 100%   BMI 35.55 kg/m²   INTAKE/OUTPUT:    Intake/Output Summary (Last 24 hours) at 2022 1149  Last data filed at 2022 5638  Gross per 24 hour   Intake 1874.71 ml   Output 1300 ml   Net 574.71 ml     URINARY CATHETER OUTPUT (Awad):   [  DRAIN/TUBE OUTPUT:   [  TEMPERATURE:  Current - Temp: 98.6 °F (37 °C); Max - Temp  Av °F (37.2 °C)  Min: 98.2 °F (36.8 °C)  Max: 100.4 °F (38 °C)  RESPIRATIONS RANGE: Resp  Av  Min: 14  Max: 18  PULSE RANGE: Pulse  Av.5  Min: 82  Max: 93  BLOOD PRESSURE RANGE:  Systolic (05BKO), VRE:512 , Min:112 , CRU:788   ; Diastolic (80GZQ), KJA:83, Min:81, Max:98    PULSE OXIMETRY RANGE: SpO2  Av.1 %  Min: 98 %  Max: 100 %    General Appearance:  awake, alert, oriented, in no acute distress  Skin:  Skin color, texture, turgor normal. No rashes or lesions. Back:  mild pain to palpation in the LS spine midline  Lungs:  Normal expansion. Clear to auscultation. No rales, rhonchi, or wheezing. Heart:  Heart sounds are normal.  Regular rate and rhythm without murmur, gallop or rub. Abdomen:  Soft, tender periumbilical, hypoactive bowel sounds. No bruits, organomegaly or masses. No R/Guarding  Extremities: Extremities warm to touch, pink, with no edema.   Musculoskeletal:  negative  Peripheral Pulses:  Capillary refill <2secs, strong peripheral pulses  Neurologic:  negative    Data  CBC with Differential:    Lab Results   Component Value Date    WBC 13.7 2022    RBC 4.05 2022    HGB 12.4 06/25/2022    HCT 36.3 06/25/2022     06/25/2022    MCV 89.6 06/25/2022    MCH 30.6 06/25/2022    MCHC 34.2 06/25/2022    RDW 13.2 06/25/2022    LYMPHOPCT 7.4 06/25/2022    MONOPCT 4.7 06/25/2022    BASOPCT 0.2 06/25/2022    MONOSABS 0.64 06/25/2022    LYMPHSABS 1.02 06/25/2022    EOSABS 0.02 06/25/2022    BASOSABS 0.03 06/25/2022     CMP:    Lab Results   Component Value Date     06/25/2022    K 3.3 06/25/2022    K 3.1 06/24/2022     06/25/2022    CO2 20 06/25/2022    BUN 11 06/25/2022    CREATININE 0.8 06/25/2022    GFRAA >60 06/25/2022    LABGLOM >60 06/25/2022    GLUCOSE 112 06/25/2022    PROT 6.1 06/25/2022    LABALBU 3.2 06/25/2022    CALCIUM 8.3 06/25/2022    BILITOT 1.4 06/25/2022    ALKPHOS 80 06/25/2022    AST 12 06/25/2022    ALT 12 06/25/2022     BMP:  Hepatic Function Panel:  Ionized Calcium:  No results found for: IONCA  Magnesium:    Lab Results   Component Value Date    MG 1.9 06/24/2022     Phosphorus:  No results found for: PHOS  LDH:    Lab Results   Component Value Date     07/30/2021     Uric Acid:  No components found for: URIC  PT/INR:    Lab Results   Component Value Date    PROTIME 14.1 07/30/2021    INR 1.3 07/30/2021     Warfarin PT/INR:  No components found for: PTPATWAR, PTINRWAR  PTT:    Lab Results   Component Value Date    APTT 27.4 07/30/2021   [APTT  Troponin:  No results found for: TROPONINI  Last 3 Troponin:  No results found for: TROPONINI  Urine Culture:  No components found for: CURINE  Blood Culture:  No components found for: CBLOOD, CFUNGUSBL  Blood Culture from [facility]:  No components found for: CBLOODLN  Stool Culture:  No components found for: CSTOOL  Sputum Culture:  No components found for: CSPUTUM  Sputum Culture for [employer]:  No components found for: CAFBSM  Throat Culture:  No components found for: CTHROAT    Vancomycin:  Peak:  No components found for: VANCOPOST   Trough:  No components found for: VANCOPRE  Digoxin:  No results found

## 2022-06-25 NOTE — PROGRESS NOTES
Progress Note  Date:2022       Room:[address] [place] [address]  Patient Vandana Snyder     YOB: 1980     Age:41 y.o. Subjective    Subjective wbc 13.7, potassium 3.3  No fevers, bilirubin 1.1 now  Getting hida scan, very uncomfortable with ng, no vomiting, just nausea  Review of Systems  Objective         Vitals Last 24 Hours:  TEMPERATURE:  Temp  Av °F (37.2 °C)  Min: 98.2 °F (36.8 °C)  Max: 100.4 °F (38 °C)  RESPIRATIONS RANGE: Resp  Av  Min: 14  Max: 18  PULSE OXIMETRY RANGE: SpO2  Av %  Min: 98 %  Max: 100 %  PULSE RANGE: Pulse  Av.4  Min: 82  Max: 93  BLOOD PRESSURE RANGE: Systolic (69ZTM), QRN:180 , Min:105 , JORDEN:741   ; Diastolic (43KKL), ZIU:02, Min:80, Max:98    I/O (24Hr): Intake/Output Summary (Last 24 hours) at 2022 0801  Last data filed at 2022 0645  Gross per 24 hour   Intake 1874.71 ml   Output 1000 ml   Net 874.71 ml     Objective  Labs/Imaging/Diagnostics    Labs:  CBC:  Recent Labs     22  0818 22   WBC 21.0* 13.7*   RBC 4.53 4.05   HGB 14.0 12.4   HCT 40.3 36.3   MCV 89.0 89.6   RDW 13.1 13.2    210     CHEMISTRIES:  Recent Labs     2218 22    137   K 3.1* 3.3*   CL 94* 104   CO2 21* 20*   BUN 15 11   CREATININE 1.2* 0.8   GLUCOSE 136* 112*   MG 1.9  --      PT/INR:No results for input(s): PROTIME, INR in the last 72 hours. APTT:No results for input(s): APTT in the last 72 hours.   LIVER PROFILE:  Recent Labs     22  0818 22  1357 22   AST 23  --  12   ALT 17  --  12   BILIDIR 0.3 0.3 0.3   BILITOT 2.6*  --  1.4*   ALKPHOS 107*  --  80       Imaging Last 24 Hours:  CT ABDOMEN PELVIS W IV CONTRAST Additional Contrast? None    Result Date: 2022  EXAMINATION: CT OF THE ABDOMEN AND PELVIS WITH CONTRAST 2022 9:31 am TECHNIQUE: CT of the abdomen and pelvis was performed with the administration of intravenous contrast. Multiplanar reformatted images are provided for review. Automated exposure control, iterative reconstruction, and/or weight based adjustment of the mA/kV was utilized to reduce the radiation dose to as low as reasonably achievable. COMPARISON: None. HISTORY: ORDERING SYSTEM PROVIDED HISTORY: History of recent lap gabino, generalized abdominal pain TECHNOLOGIST PROVIDED HISTORY: Additional Contrast?->None Reason for exam:->History of recent lap gabino, generalized abdominal pain Decision Support Exception - unselect if not a suspected or confirmed emergency medical condition->Emergency Medical Condition (MA) FINDINGS: Lower Chest: Atelectatic changes identified lung bases bilaterally. Organs: The liver is homogeneous in appearance. The spleen is unremarkable. The gallbladder is been removed. There is no intrahepatic or extrahepatic biliary ductal dilatation. No abnormal fluid collection identified within the gallbladder fossa. The adrenal glands are unremarkable. Symmetric enhancement identified of the of the kidneys bilaterally. No distension of the renal collecting systems or evidence of stones. GI/Bowel: The stomach reveals mild distension with no wall thickening. There is some dilated fluid-filled loops of small bowel proximally. There is some fluid as well seen within the colon. There is fluid seen throughout the small bowel. The appendix is normal.  There is no significant wall thickening pot. Findings suggesting an mild enteritis. Postoperative ileus cannot be excluded. There is fluid identified within the rectal vault. No signs of obvious obstruction or obstructing lesion. Pelvis: The bladder is unremarkable in appearance. Uterus reveals IUD within the endometrial canal. Peritoneum/Retroperitoneum: No abdominal retroperitoneal lymphadenopathy. No free fluid or free air. No abnormal mass or fluid collections identified. No pelvic adenopathy. No significant atherosclerotic disease within the abdominal aorta or iliac vessels.  Bones/Soft

## 2022-06-26 LAB
ALBUMIN SERPL-MCNC: 3.1 G/DL (ref 3.5–5.2)
ALP BLD-CCNC: 107 U/L (ref 35–104)
ALT SERPL-CCNC: 7 U/L (ref 0–32)
ANION GAP SERPL CALCULATED.3IONS-SCNC: 11 MMOL/L (ref 7–16)
AST SERPL-CCNC: 13 U/L (ref 0–31)
BASOPHILS ABSOLUTE: 0.03 E9/L (ref 0–0.2)
BASOPHILS RELATIVE PERCENT: 0.3 % (ref 0–2)
BILIRUB SERPL-MCNC: 1.1 MG/DL (ref 0–1.2)
BILIRUBIN DIRECT: 0.3 MG/DL (ref 0–0.3)
BILIRUBIN, INDIRECT: 0.8 MG/DL (ref 0–1)
BUN BLDV-MCNC: 5 MG/DL (ref 6–20)
C DIFF TOXIN/ANTIGEN: NORMAL
CALCIUM SERPL-MCNC: 8.3 MG/DL (ref 8.6–10.2)
CHLORIDE BLD-SCNC: 105 MMOL/L (ref 98–107)
CO2: 20 MMOL/L (ref 22–29)
CREAT SERPL-MCNC: 0.7 MG/DL (ref 0.5–1)
EOSINOPHILS ABSOLUTE: 0.06 E9/L (ref 0.05–0.5)
EOSINOPHILS RELATIVE PERCENT: 0.5 % (ref 0–6)
GFR AFRICAN AMERICAN: >60
GFR NON-AFRICAN AMERICAN: >60 ML/MIN/1.73
GLUCOSE BLD-MCNC: 133 MG/DL (ref 74–99)
HCT VFR BLD CALC: 34.6 % (ref 34–48)
HEMOGLOBIN: 12.1 G/DL (ref 11.5–15.5)
IMMATURE GRANULOCYTES #: 0.07 E9/L
IMMATURE GRANULOCYTES %: 0.6 % (ref 0–5)
LYMPHOCYTES ABSOLUTE: 1.26 E9/L (ref 1.5–4)
LYMPHOCYTES RELATIVE PERCENT: 10.9 % (ref 20–42)
MCH RBC QN AUTO: 31.3 PG (ref 26–35)
MCHC RBC AUTO-ENTMCNC: 35 % (ref 32–34.5)
MCV RBC AUTO: 89.4 FL (ref 80–99.9)
MONOCYTES ABSOLUTE: 0.76 E9/L (ref 0.1–0.95)
MONOCYTES RELATIVE PERCENT: 6.6 % (ref 2–12)
NEUTROPHILS ABSOLUTE: 9.41 E9/L (ref 1.8–7.3)
NEUTROPHILS RELATIVE PERCENT: 81.1 % (ref 43–80)
PDW BLD-RTO: 12.9 FL (ref 11.5–15)
PLATELET # BLD: 227 E9/L (ref 130–450)
PMV BLD AUTO: 10.2 FL (ref 7–12)
POTASSIUM SERPL-SCNC: 3.4 MMOL/L (ref 3.5–5)
RBC # BLD: 3.87 E12/L (ref 3.5–5.5)
SODIUM BLD-SCNC: 136 MMOL/L (ref 132–146)
TOTAL PROTEIN: 6 G/DL (ref 6.4–8.3)
WBC # BLD: 11.6 E9/L (ref 4.5–11.5)
WHITE BLOOD CELLS (WBC), STOOL: NORMAL

## 2022-06-26 PROCEDURE — 2500000003 HC RX 250 WO HCPCS: Performed by: FAMILY MEDICINE

## 2022-06-26 PROCEDURE — 6360000002 HC RX W HCPCS: Performed by: FAMILY MEDICINE

## 2022-06-26 PROCEDURE — 2580000003 HC RX 258: Performed by: FAMILY MEDICINE

## 2022-06-26 PROCEDURE — 85025 COMPLETE CBC W/AUTO DIFF WBC: CPT

## 2022-06-26 PROCEDURE — 80076 HEPATIC FUNCTION PANEL: CPT

## 2022-06-26 PROCEDURE — 80048 BASIC METABOLIC PNL TOTAL CA: CPT

## 2022-06-26 PROCEDURE — 2060000000 HC ICU INTERMEDIATE R&B

## 2022-06-26 PROCEDURE — 36415 COLL VENOUS BLD VENIPUNCTURE: CPT

## 2022-06-26 RX ADMIN — MORPHINE SULFATE 2 MG: 2 INJECTION, SOLUTION INTRAMUSCULAR; INTRAVENOUS at 01:48

## 2022-06-26 RX ADMIN — WATER 1000 MG: 1 INJECTION INTRAMUSCULAR; INTRAVENOUS; SUBCUTANEOUS at 12:38

## 2022-06-26 RX ADMIN — METRONIDAZOLE 500 MG: 500 INJECTION, SOLUTION INTRAVENOUS at 05:52

## 2022-06-26 RX ADMIN — METRONIDAZOLE 500 MG: 500 INJECTION, SOLUTION INTRAVENOUS at 12:38

## 2022-06-26 RX ADMIN — MORPHINE SULFATE 2 MG: 2 INJECTION, SOLUTION INTRAMUSCULAR; INTRAVENOUS at 05:50

## 2022-06-26 RX ADMIN — METRONIDAZOLE 500 MG: 500 INJECTION, SOLUTION INTRAVENOUS at 21:04

## 2022-06-26 RX ADMIN — POTASSIUM CHLORIDE, DEXTROSE MONOHYDRATE AND SODIUM CHLORIDE: 150; 5; 450 INJECTION, SOLUTION INTRAVENOUS at 12:35

## 2022-06-26 RX ADMIN — SODIUM CHLORIDE, PRESERVATIVE FREE 10 ML: 5 INJECTION INTRAVENOUS at 08:04

## 2022-06-26 ASSESSMENT — PAIN DESCRIPTION - DESCRIPTORS
DESCRIPTORS: SHARP
DESCRIPTORS: DULL;ACHING
DESCRIPTORS: SHARP

## 2022-06-26 ASSESSMENT — PAIN SCALES - GENERAL
PAINLEVEL_OUTOF10: 10
PAINLEVEL_OUTOF10: 8
PAINLEVEL_OUTOF10: 4

## 2022-06-26 ASSESSMENT — PAIN DESCRIPTION - LOCATION
LOCATION: ABDOMEN

## 2022-06-26 ASSESSMENT — PAIN DESCRIPTION - ONSET: ONSET: ON-GOING

## 2022-06-26 ASSESSMENT — PAIN - FUNCTIONAL ASSESSMENT
PAIN_FUNCTIONAL_ASSESSMENT: ACTIVITIES ARE NOT PREVENTED

## 2022-06-26 ASSESSMENT — PAIN DESCRIPTION - FREQUENCY: FREQUENCY: INTERMITTENT

## 2022-06-26 ASSESSMENT — PAIN DESCRIPTION - PAIN TYPE: TYPE: ACUTE PAIN

## 2022-06-26 NOTE — PROGRESS NOTES
S:  3 further diarrhea BM since yesterday. Hungry, thirsy, and wants food. No fever, chills, nausea, or emesis. O: VSS afebrile    Lungs- CTA    Cor- RRR    Abd- suprapubic tenderness without R/G  Labs:  WBC  11.6              Hg 12.2              K+ 3.4              TB: 1.1              Alk phos 107  Rad; HIDA- negative  A:  Abdominal pain, hyperbilirubinemia, UTI, Hypokalemia       History of GB May 2022       No acute surgical problem; Improved clinical exam, labs       Abnormal CT with fluid in SB and Colon- enterolitis? Plan:  Ok to begin diet, see orders. Continue to monitor exam and labs.

## 2022-06-26 NOTE — PLAN OF CARE
Problem: Pain  Goal: Verbalizes/displays adequate comfort level or baseline comfort level  Outcome: Progressing     Problem: Discharge Planning  Goal: Discharge to home or other facility with appropriate resources  Outcome: Progressing     Problem: Safety - Adult  Goal: Free from fall injury  Outcome: Progressing     Problem: ABCDS Injury Assessment  Goal: Absence of physical injury  Outcome: Progressing

## 2022-06-26 NOTE — PROGRESS NOTES
Progress Note  Date:2022       Room:[address] [place], [place] [address]  Patient Idania Holden     YOB: 1980     Age:41 y.o. Subjective    Subjective potassium 3.4, wbc 11.6, bilirubin normal  hida scan negative  On iv ab, better with ng out, no vomiting but continues wioth a lot abdominal cramping and diarrhea, no fevers, nothing by po yet  Review of Systems  Objective         Vitals Last 24 Hours:  TEMPERATURE:  Temp  Av °F (37.2 °C)  Min: 98.6 °F (37 °C)  Max: 99.3 °F (37.4 °C)  RESPIRATIONS RANGE: Resp  Av.7  Min: 16  Max: 24  PULSE OXIMETRY RANGE: SpO2  Av.5 %  Min: 99 %  Max: 100 %  PULSE RANGE: Pulse  Av  Min: 93  Max: 101  BLOOD PRESSURE RANGE: Systolic (87KID), TXE:863 , Min:128 , OPW:635   ; Diastolic (66YSD), OPM:73, Min:89, Max:96    I/O (24Hr): Intake/Output Summary (Last 24 hours) at 2022 0720  Last data filed at 2022 0549  Gross per 24 hour   Intake 3732.61 ml   Output 800 ml   Net 2932.61 ml     Objective  Labs/Imaging/Diagnostics    Labs:  CBC:  Recent Labs     22  0818 22   WBC 21.0* 13.7* 11.6*   RBC 4.53 4.05 3.87   HGB 14.0 12.4 12.1   HCT 40.3 36.3 34.6   MCV 89.0 89.6 89.4   RDW 13.1 13.2 12.9    210 227     CHEMISTRIES:  Recent Labs     22  0818 22  1645 22  0217    137  --  136   K 3.1* 3.3* 3.6 3.4*   CL 94* 104  --  105   CO2 21* 20*  --  20*   BUN 15 11  --  5*   CREATININE 1.2* 0.8  --  0.7   GLUCOSE 136* 112*  --  133*   MG 1.9  --   --   --      PT/INR:No results for input(s): PROTIME, INR in the last 72 hours. APTT:No results for input(s): APTT in the last 72 hours. LIVER PROFILE:  Recent Labs     22  0818 22  0818 22  1357 22  0227 22  0217   AST 23  --   --  12 13   ALT 17  --   --  12 7   BILIDIR 0.3   < > 0.3 0.3 0.3   BILITOT 2.6*  --   --  1.4* 1.1   ALKPHOS 107*  --   --  80 107*    < > = values in this interval not displayed. Imaging Last 24 Hours:  NM HEPATOBILIARY    Result Date: 6/25/2022  EXAMINATION: NUCLEAR MEDICINE HEPATOBILIARY SCINTIGRAPHY (HIDA SCAN). 6/25/2022 7:56 am TECHNIQUE: Approximately 8.4 mCi Tc-99m Mebrofenin (Choletec) was administered IV. Then, dynamic images of the abdomen were obtained in the anterior projection for 60 min(s). A right lateral view was also obtained at 60 min(s). HISTORY: ORDERING SYSTEM PROVIDED HISTORY: hyperbilirubinemia s/p cholecystectomy TECHNOLOGIST PROVIDED HISTORY: Reason for exam:->hyperbilirubinemia s/p cholecystectomy Decision Support Exception - unselect if not a suspected or confirmed emergency medical condition->Emergency Medical Condition (MA) What reading provider will be dictating this exam?->CRC FINDINGS: Prompt, homogenous uptake by the liver is noted with normal appearance of radiotracer excretion into the biliary system. Clearance of blood pool activity appears appropriate. The small bowel is visualized in appropriate sequence. There is no evidence of extravasation of tracer to suggest a bile leak. No convincing scintigraphic evidence of a bile leak. No signs of obstruction. CT ABDOMEN PELVIS W IV CONTRAST Additional Contrast? None    Result Date: 6/24/2022  EXAMINATION: CT OF THE ABDOMEN AND PELVIS WITH CONTRAST 6/24/2022 9:31 am TECHNIQUE: CT of the abdomen and pelvis was performed with the administration of intravenous contrast. Multiplanar reformatted images are provided for review. Automated exposure control, iterative reconstruction, and/or weight based adjustment of the mA/kV was utilized to reduce the radiation dose to as low as reasonably achievable. COMPARISON: None.  HISTORY: ORDERING SYSTEM PROVIDED HISTORY: History of recent lap gabino, generalized abdominal pain TECHNOLOGIST PROVIDED HISTORY: Additional Contrast?->None Reason for exam:->History of recent lap gabino, generalized abdominal pain Decision Support Exception - unselect if not a suspected or confirmed emergency medical condition->Emergency Medical Condition (MA) FINDINGS: Lower Chest: Atelectatic changes identified lung bases bilaterally. Organs: The liver is homogeneous in appearance. The spleen is unremarkable. The gallbladder is been removed. There is no intrahepatic or extrahepatic biliary ductal dilatation. No abnormal fluid collection identified within the gallbladder fossa. The adrenal glands are unremarkable. Symmetric enhancement identified of the of the kidneys bilaterally. No distension of the renal collecting systems or evidence of stones. GI/Bowel: The stomach reveals mild distension with no wall thickening. There is some dilated fluid-filled loops of small bowel proximally. There is some fluid as well seen within the colon. There is fluid seen throughout the small bowel. The appendix is normal.  There is no significant wall thickening pot. Findings suggesting an mild enteritis. Postoperative ileus cannot be excluded. There is fluid identified within the rectal vault. No signs of obvious obstruction or obstructing lesion. Pelvis: The bladder is unremarkable in appearance. Uterus reveals IUD within the endometrial canal. Peritoneum/Retroperitoneum: No abdominal retroperitoneal lymphadenopathy. No free fluid or free air. No abnormal mass or fluid collections identified. No pelvic adenopathy. No significant atherosclerotic disease within the abdominal aorta or iliac vessels. Bones/Soft Tissues: The bony structures reveal minimal degenerative changes seen within the spine and pelvis     . Mildly dilated fluid-filled loops of small bowel seen diffusely throughout the abdomen. There is fluid as well seen within the colon. Findings may suggest postoperative ileus or possibly a mild enterocolitis. No significant wall thickening. No signs of obvious obstruction or obstructing lesion. No abnormality identified within the gallbladder fossa.  No intrahepatic or extrahepatic biliary ductal dilatation. XR CHEST PORTABLE    Result Date: 6/24/2022  EXAMINATION: ONE XRAY VIEW OF THE CHEST 6/24/2022 2:12 pm COMPARISON: None. HISTORY: ORDERING SYSTEM PROVIDED HISTORY: ng tube placement TECHNOLOGIST PROVIDED HISTORY: Reason for exam:->ng tube placement FINDINGS: Three views of the chest and upper abdomen demonstrate a NG tube in good position with the tip approximately the 11 cm below the GE junction lying in the fundus of the stomach. The lower lung fields demonstrate minimal subsegmental atelectasis left lung base. There are multiple loops of slightly dilated small bowel consistent with an ileus. NG tube in satisfactory position.        Awake, alert  Appears anxious  Heart rrr  Lungs ctab  Abdomen distended, bs present but sensitive throughout, no rigidity  No edema  Assessment//Plan           Hospital Problems           Last Modified POA    * (Principal) Abdominal pain 6/24/2022 Yes    Leukocytosis 6/24/2022 Yes    UTI (urinary tract infection) 6/24/2022 Yes    Hyperbilirubinemia 6/24/2022 Yes    Enterocolitis 6/24/2022 Yes    History of laparoscopic cholecystectomy 6/24/2022 Yes        Assessment & Plan  Acute abdominal paion  Leukocytosis  Uti, culture pending, on iv ab  Hyperbilirubin, better  Hypokalemia, replace in fluids  Electronically signed by Darlene Rodriguez DO on 6/26/22 at 7:20 AM EDT

## 2022-06-27 VITALS
DIASTOLIC BLOOD PRESSURE: 88 MMHG | RESPIRATION RATE: 18 BRPM | WEIGHT: 209.4 LBS | OXYGEN SATURATION: 98 % | HEART RATE: 77 BPM | TEMPERATURE: 99 F | BODY MASS INDEX: 35.75 KG/M2 | SYSTOLIC BLOOD PRESSURE: 130 MMHG | HEIGHT: 64 IN

## 2022-06-27 LAB
ALBUMIN SERPL-MCNC: 3.2 G/DL (ref 3.5–5.2)
ALP BLD-CCNC: 73 U/L (ref 35–104)
ALT SERPL-CCNC: 9 U/L (ref 0–32)
ANION GAP SERPL CALCULATED.3IONS-SCNC: 12 MMOL/L (ref 7–16)
AST SERPL-CCNC: 10 U/L (ref 0–31)
BASOPHILS ABSOLUTE: 0.04 E9/L (ref 0–0.2)
BASOPHILS RELATIVE PERCENT: 0.5 % (ref 0–2)
BILIRUB SERPL-MCNC: 0.7 MG/DL (ref 0–1.2)
BILIRUBIN DIRECT: <0.2 MG/DL (ref 0–0.3)
BILIRUBIN, INDIRECT: ABNORMAL MG/DL (ref 0–1)
BUN BLDV-MCNC: 4 MG/DL (ref 6–20)
CALCIUM SERPL-MCNC: 8.6 MG/DL (ref 8.6–10.2)
CHLORIDE BLD-SCNC: 103 MMOL/L (ref 98–107)
CO2: 21 MMOL/L (ref 22–29)
CREAT SERPL-MCNC: 0.6 MG/DL (ref 0.5–1)
EOSINOPHILS ABSOLUTE: 0.13 E9/L (ref 0.05–0.5)
EOSINOPHILS RELATIVE PERCENT: 1.6 % (ref 0–6)
GFR AFRICAN AMERICAN: >60
GFR NON-AFRICAN AMERICAN: >60 ML/MIN/1.73
GLUCOSE BLD-MCNC: 128 MG/DL (ref 74–99)
HCT VFR BLD CALC: 36.6 % (ref 34–48)
HEMOGLOBIN: 12.6 G/DL (ref 11.5–15.5)
IMMATURE GRANULOCYTES #: 0.06 E9/L
IMMATURE GRANULOCYTES %: 0.7 % (ref 0–5)
LYMPHOCYTES ABSOLUTE: 1.23 E9/L (ref 1.5–4)
LYMPHOCYTES RELATIVE PERCENT: 15 % (ref 20–42)
MCH RBC QN AUTO: 30.7 PG (ref 26–35)
MCHC RBC AUTO-ENTMCNC: 34.4 % (ref 32–34.5)
MCV RBC AUTO: 89.3 FL (ref 80–99.9)
MONOCYTES ABSOLUTE: 0.67 E9/L (ref 0.1–0.95)
MONOCYTES RELATIVE PERCENT: 8.2 % (ref 2–12)
NEUTROPHILS ABSOLUTE: 6.08 E9/L (ref 1.8–7.3)
NEUTROPHILS RELATIVE PERCENT: 74 % (ref 43–80)
PDW BLD-RTO: 12.7 FL (ref 11.5–15)
PLATELET # BLD: 293 E9/L (ref 130–450)
PMV BLD AUTO: 10.5 FL (ref 7–12)
POTASSIUM SERPL-SCNC: 3.2 MMOL/L (ref 3.5–5)
RBC # BLD: 4.1 E12/L (ref 3.5–5.5)
SODIUM BLD-SCNC: 136 MMOL/L (ref 132–146)
TOTAL PROTEIN: 6.4 G/DL (ref 6.4–8.3)
WBC # BLD: 8.2 E9/L (ref 4.5–11.5)

## 2022-06-27 PROCEDURE — 80076 HEPATIC FUNCTION PANEL: CPT

## 2022-06-27 PROCEDURE — 85025 COMPLETE CBC W/AUTO DIFF WBC: CPT

## 2022-06-27 PROCEDURE — 36415 COLL VENOUS BLD VENIPUNCTURE: CPT

## 2022-06-27 PROCEDURE — 80048 BASIC METABOLIC PNL TOTAL CA: CPT

## 2022-06-27 PROCEDURE — 2580000003 HC RX 258: Performed by: FAMILY MEDICINE

## 2022-06-27 PROCEDURE — 6370000000 HC RX 637 (ALT 250 FOR IP): Performed by: FAMILY MEDICINE

## 2022-06-27 PROCEDURE — 6360000002 HC RX W HCPCS: Performed by: FAMILY MEDICINE

## 2022-06-27 PROCEDURE — 2500000003 HC RX 250 WO HCPCS: Performed by: FAMILY MEDICINE

## 2022-06-27 RX ORDER — CEFDINIR 300 MG/1
300 CAPSULE ORAL 2 TIMES DAILY
Qty: 10 CAPSULE | Refills: 0 | Status: SHIPPED | OUTPATIENT
Start: 2022-06-27 | End: 2022-07-01

## 2022-06-27 RX ORDER — POTASSIUM CHLORIDE 20 MEQ/1
40 TABLET, EXTENDED RELEASE ORAL ONCE
Status: COMPLETED | OUTPATIENT
Start: 2022-06-27 | End: 2022-06-27

## 2022-06-27 RX ADMIN — SODIUM CHLORIDE, PRESERVATIVE FREE 10 ML: 5 INJECTION INTRAVENOUS at 08:19

## 2022-06-27 RX ADMIN — METRONIDAZOLE 500 MG: 500 INJECTION, SOLUTION INTRAVENOUS at 12:35

## 2022-06-27 RX ADMIN — WATER 1000 MG: 1 INJECTION INTRAMUSCULAR; INTRAVENOUS; SUBCUTANEOUS at 12:30

## 2022-06-27 RX ADMIN — POTASSIUM CHLORIDE 40 MEQ: 1500 TABLET, EXTENDED RELEASE ORAL at 12:30

## 2022-06-27 RX ADMIN — METRONIDAZOLE 500 MG: 500 INJECTION, SOLUTION INTRAVENOUS at 05:41

## 2022-06-27 NOTE — PROGRESS NOTES
GENERAL SURGERY  DAILY PROGRESS NOTE  6/27/2022    Chief Complaint   Patient presents with    Abdominal Pain     diffuse cramping started yesterday    Nausea    Constipation     no solid BM for 3-4 days       Subjective:  Feeling much better this morning, tolerating diet, no nausea. Pain resolved.  No diarrhea overnight    Objective:  /81   Pulse 87   Temp 99.7 °F (37.6 °C) (Oral)   Resp 20   Ht 5' 4\" (1.626 m)   Wt 209 lb 6.4 oz (95 kg)   SpO2 100%   BMI 35.94 kg/m²     GENERAL:  Laying in bed, awake, alert, cooperative, no apparent distress  HEAD: Normocephalic, atraumatic  EYES: No sclera icterus, pupils equal  LUNGS:  No increased work of breathing  CARDIOVASCULAR:  RR  ABDOMEN:  Soft, non-tender, non-distended  EXTREMITIES: No edema or swelling  SKIN: Warm and dry    Assessment/Plan:  39 y.o. female with enterocolitis now resolved     - advance diet  - ok to discharge from surgery standpoint if tolerating     Electronically signed by Becky Peck MD on 6/27/2022 at 6:58 AM Pulses equal bilaterally, no edema present.

## 2022-06-27 NOTE — PROGRESS NOTES
Admit Date: 6/24/2022    Subjective:     Patient states she still has some abdominal pain but nausea is much better and her appetite is good. She still has loose stools. Scheduled Meds:   sodium chloride flush  5-40 mL IntraVENous 2 times per day    enoxaparin  40 mg SubCUTAneous Daily    cefTRIAXone (ROCEPHIN) IV  1,000 mg IntraVENous Q24H    metroNIDAZOLE  500 mg IntraVENous Q8H     Continuous Infusions:   dextrose 5% and 0.45% NaCl with KCl 20 mEq 100 mL/hr at 06/27/22 0540    sodium chloride Stopped (06/24/22 1409)    sodium chloride       PRN Meds:butamben-tetracaine-benzocaine, sodium chloride flush, sodium chloride, ondansetron **OR** ondansetron, polyethylene glycol, acetaminophen **OR** acetaminophen, morphine      Objective:     I/O last 3 completed shifts: In: 5672.1 [I.V.:5051; IV Piggyback:621.1]  Out: -   No intake/output data recorded. /81   Pulse 87   Temp 99.7 °F (37.6 °C) (Oral)   Resp 20   Ht 5' 4\" (1.626 m)   Wt 209 lb 6.4 oz (95 kg)   SpO2 100%   BMI 35.94 kg/m²     LUNGS:  No increased work of breathing, good air exchange, clear to auscultation bilaterally, no crackles or wheezing  CARDIOVASCULAR:  Normal apical impulse, regular rate and rhythm, normal S1 and S2, no S3 or S4, and no murmur noted  ABDOMEN:  Flat, soft, with diffuse tenderness worst in RLQ and suprapubic area.     MUSCULOSKELETAL:  No cyanosis, no edema, no clubbing    Data Review  CBC:   Recent Labs     06/24/22  0818 06/25/22 0227 06/26/22 0217   WBC 21.0* 13.7* 11.6*   HGB 14.0 12.4 12.1    210 227     BMP:    Recent Labs     06/24/22  0818 06/25/22 0227 06/25/22  1645 06/26/22 0217    137  --  136   K 3.1* 3.3* 3.6 3.4*   CL 94* 104  --  105   CO2 21* 20*  --  20*   BUN 15 11  --  5*   CREATININE 1.2* 0.8  --  0.7   GLUCOSE 136* 112*  --  133*     Coagulation:   Lab Results   Component Value Date    INR 1.3 07/30/2021    APTT 27.4 07/30/2021     Cardiac markers: No results found for: CKMB, TROPONINT, MYOGLOBIN  Lab Results   Component Value Date    LABALBU 3.1 (L) 06/26/2022     Lab Results   Component Value Date    ALT 7 06/26/2022    AST 13 06/26/2022    ALKPHOS 107 (H) 06/26/2022    BILITOT 1.1 06/26/2022         Assessment:     Patient Active Problem List    Diagnosis Date Noted    Abdominal pain 06/24/2022    Leukocytosis 06/24/2022    UTI (urinary tract infection) 06/24/2022    Hyperbilirubinemia 06/24/2022    Enterocolitis 06/24/2022    History of laparoscopic cholecystectomy 06/24/2022          Plan:     Advance diet and discharge home later this afternoon if she is tolerating oral diet well and if labs are ok.

## 2022-06-28 NOTE — PROGRESS NOTES
Physician Progress Note      Lona Fernandes  [facility] #:                  [id number]  :                       1980  ADMIT DATE:       2022 7:30 AM  DISCH DATE:        2022 4:41 PM  RESPONDING  PROVIDER #:        Qi Johnson MD          QUERY TEXT:    Dear Attending Physician,    Pt admitted with UTI, Enterocolitis. Pt noted to have elevated  WBC     21.0-->  13.7 -->  11.6 , Lactic acid 2.4, Vitals 98.2 106 16 105/79. If possible, please document in the progress notes and discharge summary if   you are evaluating and /or treating any of the following: The medical record reflects the following:  Risk Factors: UTI, Enterocolitis  Clinical Indicators: Per H/P,\". Stephany Fanning Stephany Fanning Continue with Rocephin and Flagyl to treat   UTI and enterocolitis possibilities . Stephany Fanning Stephany Fanning \"  WBC   21.0-->  13.7 -->    11.6 , Lactic acid 2.4, Vitals 98.2 106 16 105/79. Treatment: IV Rocephin, Flagyl    Thank you,  Erwin Gaspar RN [facility]  Clinical Documentation Improvement Specialist  [phone]  Options provided:  -- Sepsis likely due to UTI, present on admission  -- Sepsis likely due to UTI, present on admission, now resolved  -- Sepsis was ruled out  -- Other - I will add my own diagnosis  -- Disagree - Not applicable / Not valid  -- Disagree - Clinically unable to determine / Unknown  -- Refer to Clinical Documentation Reviewer    PROVIDER RESPONSE TEXT:    After further study, sepsis was ruled out for this patient.     Query created by: Christian Whitlock on 2022 10:31 AM      Electronically signed by:  Qi Johnson MD 2022 10:48 AM

## 2022-06-28 NOTE — DISCHARGE SUMMARY
Physician Discharge Summary     Patient ID:  Mercedes Martin  [id number]  86 y.o.  1980    Admit date: 6/24/2022    Discharge date and time: 6/27/2022  4:41 PM     Admitting Physician: Abdullahi Fuentes MD     Consulting physicians: Dr. Jeannine Rodriguez    Admission Diagnoses: Enterocolitis [K52.9]  Hypokalemia [E87.6]  Nausea [R11.0]  Abdominal pain [R10.9]  Generalized abdominal pain [R10.84]  Constipation, unspecified constipation type [K59.00]  Leukocytosis, unspecified type [D72.829]    Discharge Diagnoses:    Abdominal pain 06/24/2022    Leukocytosis 06/24/2022    UTI (urinary tract infection) 06/24/2022    Hyperbilirubinemia 06/24/2022    Enterocolitis 06/24/2022    History of laparoscopic cholecystectomy 06/24/2022        Hypokalemia      Hospital Course: Patient had slow improvement of abdominal pain. Bilirubin returned to normal, she remained afebrile. Diarrhea persisted. She was treated with IV Rocephin and metronidazole during the course of hospitalization. White blood cell count also returned to normal.      Disposition: Home, condition on discharge is stable. Patient Instructions:      Medication List      START taking these medications    cefdinir 300 MG capsule  Commonly known as: OMNICEF  Take 1 capsule by mouth 2 times daily for 4 days        CONTINUE taking these medications    Centrum Adults Tabs     levothyroxine 50 MCG tablet  Commonly known as: SYNTHROID           Where to Get Your Medications      These medications were sent to [address] [place] [address], [address] [place] [id number]    Phone: [phone]   · cefdinir 300 MG capsule       Activity: activity as tolerated  Diet: regular diet    Follow-up with primary care physician in 1 week.     Signed:  Abdullahi Fuentes MD  6/28/2022  8:53 AM

## 2022-06-29 LAB
BLOOD CULTURE, ROUTINE: NORMAL
CULTURE, BLOOD 2: NORMAL

## 2022-07-24 PROBLEM — N39.0 UTI (URINARY TRACT INFECTION): Status: RESOLVED | Noted: 2022-06-24 | Resolved: 2022-07-24

## 2023-12-12 ENCOUNTER — HOSPITAL ENCOUNTER (OUTPATIENT)
Age: 43
Discharge: HOME | End: 2023-12-14

## 2023-12-12 LAB
ABO + RH BLD: NORMAL
ARM BAND NUMBER: NORMAL
BLOOD BANK SAMPLE EXPIRATION: NORMAL
BLOOD GROUP ANTIBODIES SERPL: NEGATIVE
ERYTHROCYTE [DISTWIDTH] IN BLOOD BY AUTOMATED COUNT: 12.6 % (ref 11.5–15)
HCT VFR BLD AUTO: 41.7 % (ref 34–48)
HGB BLD-MCNC: 13.7 G/DL (ref 11.5–15.5)
MCH RBC QN AUTO: 29.8 PG (ref 26–35)
MCHC RBC AUTO-ENTMCNC: 32.9 G/DL (ref 32–34.5)
MCV RBC AUTO: 90.7 FL (ref 80–99.9)
PLATELET # BLD AUTO: 302 K/UL (ref 130–450)
PMV BLD AUTO: 10.4 FL (ref 7–12)
RBC # BLD AUTO: 4.6 M/UL (ref 3.5–5.5)
WBC OTHER # BLD: 8.3 K/UL (ref 4.5–11.5)

## 2023-12-12 PROCEDURE — 86901 BLOOD TYPING SEROLOGIC RH(D): CPT

## 2023-12-12 PROCEDURE — 86900 BLOOD TYPING SEROLOGIC ABO: CPT

## 2023-12-12 PROCEDURE — 86850 RBC ANTIBODY SCREEN: CPT

## 2023-12-12 PROCEDURE — 85027 COMPLETE CBC AUTOMATED: CPT
